# Patient Record
Sex: FEMALE
[De-identification: names, ages, dates, MRNs, and addresses within clinical notes are randomized per-mention and may not be internally consistent; named-entity substitution may affect disease eponyms.]

---

## 2018-01-31 ENCOUNTER — HOSPITAL ENCOUNTER (EMERGENCY)
Dept: HOSPITAL 14 - H.ER | Age: 83
LOS: 1 days | Discharge: NURSING, REHAB OR LONG-TERM CARE | End: 2018-02-01
Payer: SELF-PAY

## 2018-01-31 DIAGNOSIS — F43.20: Primary | ICD-10-CM

## 2018-01-31 DIAGNOSIS — N39.0: ICD-10-CM

## 2018-01-31 LAB
APAP SERPL-MCNC: < 10 UG/ML (ref 10–30)
BACTERIA #/AREA URNS HPF: (no result) /[HPF]
BASOPHILS # BLD AUTO: 0.1 K/UL (ref 0–0.2)
BASOPHILS NFR BLD: 1.2 % (ref 0–2)
BILIRUB UR-MCNC: NEGATIVE MG/DL
BUN SERPL-MCNC: 32 MG/DL (ref 7–17)
CALCIUM SERPL-MCNC: 10.4 MG/DL (ref 8.4–10.2)
COLOR UR: (no result)
EOSINOPHIL # BLD AUTO: 0.1 K/UL (ref 0–0.7)
EOSINOPHIL NFR BLD: 1.7 % (ref 0–4)
ERYTHROCYTE [DISTWIDTH] IN BLOOD BY AUTOMATED COUNT: 16.3 % (ref 11.5–14.5)
GFR NON-AFRICAN AMERICAN: 33
GLUCOSE UR STRIP-MCNC: (no result) MG/DL
HGB BLD-MCNC: 13.1 G/DL (ref 12–16)
LEUKOCYTE ESTERASE UR-ACNC: (no result) LEU/UL
LYMPHOCYTES # BLD AUTO: 1.5 K/UL (ref 1–4.3)
LYMPHOCYTES NFR BLD AUTO: 19 % (ref 20–40)
MCH RBC QN AUTO: 32.9 PG (ref 27–31)
MCHC RBC AUTO-ENTMCNC: 33 G/DL (ref 33–37)
MCV RBC AUTO: 99.8 FL (ref 81–99)
MONOCYTES # BLD: 0.8 K/UL (ref 0–0.8)
MONOCYTES NFR BLD: 9.7 % (ref 0–10)
NEUTROPHILS # BLD: 5.5 K/UL (ref 1.8–7)
NEUTROPHILS NFR BLD AUTO: 68.4 % (ref 50–75)
NRBC BLD AUTO-RTO: 0.1 % (ref 0–0)
PH UR STRIP: 7 [PH] (ref 5–8)
PLATELET # BLD: 242 K/UL (ref 130–400)
PMV BLD AUTO: 8.4 FL (ref 7.2–11.7)
PROT UR STRIP-MCNC: 100 MG/DL
RBC # BLD AUTO: 3.99 MIL/UL (ref 3.8–5.2)
RBC # UR STRIP: (no result) /UL
SALICYLATES SERPL-MCNC: < 1 MG/DL
SP GR UR STRIP: 1.01 (ref 1–1.03)
SQUAMOUS EPITHIAL: 3 /HPF (ref 0–5)
URINE CLARITY: (no result)
URINE NITRATE: NEGATIVE
UROBILINOGEN UR-MCNC: (no result) MG/DL (ref 0.2–1)
WBC # BLD AUTO: 8 K/UL (ref 4.8–10.8)
WBC CLUMPS # UR AUTO: (no result) /HPF

## 2018-01-31 PROCEDURE — 99283 EMERGENCY DEPT VISIT LOW MDM: CPT

## 2018-01-31 PROCEDURE — 81003 URINALYSIS AUTO W/O SCOPE: CPT

## 2018-01-31 PROCEDURE — 80048 BASIC METABOLIC PNL TOTAL CA: CPT

## 2018-01-31 PROCEDURE — 85025 COMPLETE CBC W/AUTO DIFF WBC: CPT

## 2018-01-31 NOTE — ED PDOC
HPI: Psych/Substance Abuse


Time Seen by Provider: 01/31/18 22:44


Chief Complaint (Nursing): Psychiatric Evaluation


Chief Complaint (Provider): Psychiatric Evaluation


History Per: Patient, EMS


History/Exam Limitations: no limitations


Onset/Duration Of Symptoms: Mins (prior to arrival )


Current Symptoms Are (Timing): Still Present


Additional Complaint(s): 


82 year old female was brought in by EMS for psychiatric evaluation, onset 

minutes prior to arrival. Patient was sent from nursing home after she was 

found smoking in her room. She then tried to hide herself in the bathroom and 

had to be forcibly extricated by EMS. Patient refuses to talk about the 

incident and currently has no complaints.








PMD: Dr. Kenny Olmos MD 











Past Medical History


Reviewed: Historical Data, Nursing Documentation, Vital Signs


Vital Signs: 





 Last Vital Signs











Temp  98.0 F   01/31/18 22:40


 


Pulse  96 H  01/31/18 22:40


 


Resp  16   01/31/18 22:40


 


BP  164/94 H  01/31/18 22:40


 


Pulse Ox  100   01/31/18 22:40














- Medical History


PMH: No Chronic Diseases





- Surgical History


Surgical History: No Surg Hx





- Family History


Family History: States: Unknown Family Hx





- Home Medications


Home Medications: 


 Ambulatory Orders











 Medication  Instructions  Recorded


 


Ciprofloxacin [Cipro] 500 mg PO BID 5 Days  tab 02/01/18














- Allergies


Allergies/Adverse Reactions: 


 Allergies











Allergy/AdvReac Type Severity Reaction Status Date / Time


 


cortisone Allergy  RASH Verified 01/31/18 22:43


 


mustard Allergy  RASH Uncoded 01/31/18 22:43














Review of Systems


ROS Statement: Except As Marked, All Systems Reviewed And Found Negative





Physical Exam





- Reviewed


Nursing Documentation Reviewed: Yes


Vital Signs Reviewed: Yes





- Physical Exam


Appears: Positive for: Non-toxic, No Acute Distress


Head Exam: Positive for: ATRAUMATIC, NORMOCEPHALIC


Skin: Positive for: Normal Color, Warm, Dry


Eye Exam: Positive for: EOMI, Normal appearance, PERRL


Neck: Positive for: Normal, Painless ROM, Supple


Cardiovascular/Chest: Positive for: Regular Rate, Rhythm.  Negative for: Murmur


Respiratory: Positive for: Normal Breath Sounds.  Negative for: Respiratory 

Distress


Gastrointestinal/Abdominal: Positive for: Normal Exam, Soft


Back: Positive for: Normal Inspection


Extremity: Positive for: Normal ROM.  Negative for: Deformity


Neurologic/Psych: Positive for: Alert, CNs II-XII (intact), Oriented (x 3).  

Negative for: Motor/Sensory Deficits, Cerebellar Tests





- Laboratory Results


Result Diagrams: 


 01/31/18 23:23





 01/31/18 23:23





- ECG


O2 Sat by Pulse Oximetry: 100 (RA)


Pulse Ox Interpretation: Normal





Medical Decision Making


Medical Decision Making: 


Time: 22:53 


Impression: adjustment disorder 


Initial Plan: 


--Acetaminophen 


--Alcohol serum 


--BMP 


--Urine drug


--Salicylate 


--CBC with differentials 


--urinalysis 


--Cipro 500 mg PO 








Patient was cleared by crisis by Dr. Fox. Diagnosed with adjustment 

disorder. 





Patient requires no further treatment in the ED at this time. Will be 

discharged home. Return if symptoms persist or worsen. 


--------------------------------------------------------------------------------

-----------------


Scribe Attestation:


Documented by Juliana Saenz, acting as a scribe for Kaleb Dawn MD.





Provider Scribe Attestation:


All medical record entries made by the Scribe were at my direction and 

personally dictated by me. I have reviewed the chart and agree that the record 

accurately reflects my personal performance of the history, physical exam, 

medical decision making, and the department course for this patient. I have 

also personally directed, reviewed, and agree with the discharge instructions 

and disposition.








Disposition





- Clinical Impression


Clinical Impression: 


 UTI (urinary tract infection), Adjustment disorder








- Patient ED Disposition


Is Patient to be Admitted: No





- Disposition


Referrals: 


[facility] [Outside]


Disposition: Routine/Home


Disposition Time: 00:46


Condition: IMPROVED


Prescriptions: 


Ciprofloxacin [Cipro] 500 mg PO BID 5 Days  tab


Instructions:  Urinary Tract Infection in Women (GEN), Mood Disorders (ED)


Forms:  [facility] (English)

## 2018-02-01 ENCOUNTER — HOSPITAL ENCOUNTER (INPATIENT)
Dept: HOSPITAL 14 - H.ER | Age: 83
LOS: 8 days | Discharge: NURSING, REHAB OR LONG-TERM CARE | DRG: 872 | End: 2018-02-09
Attending: INTERNAL MEDICINE | Admitting: INTERNAL MEDICINE
Payer: MEDICARE

## 2018-02-01 VITALS — OXYGEN SATURATION: 100 %

## 2018-02-01 VITALS
RESPIRATION RATE: 17 BRPM | HEART RATE: 92 BPM | TEMPERATURE: 98.2 F | DIASTOLIC BLOOD PRESSURE: 90 MMHG | SYSTOLIC BLOOD PRESSURE: 160 MMHG

## 2018-02-01 DIAGNOSIS — N39.0: ICD-10-CM

## 2018-02-01 DIAGNOSIS — B96.20: ICD-10-CM

## 2018-02-01 DIAGNOSIS — A41.9: Primary | ICD-10-CM

## 2018-02-01 DIAGNOSIS — E78.00: ICD-10-CM

## 2018-02-01 DIAGNOSIS — N17.9: ICD-10-CM

## 2018-02-01 DIAGNOSIS — F17.210: ICD-10-CM

## 2018-02-01 DIAGNOSIS — F39: ICD-10-CM

## 2018-02-01 DIAGNOSIS — D64.9: ICD-10-CM

## 2018-02-01 DIAGNOSIS — I10: ICD-10-CM

## 2018-02-01 DIAGNOSIS — Z16.12: ICD-10-CM

## 2018-02-01 DIAGNOSIS — F03.91: ICD-10-CM

## 2018-02-01 DIAGNOSIS — Z91.14: ICD-10-CM

## 2018-02-01 DIAGNOSIS — E86.0: ICD-10-CM

## 2018-02-01 DIAGNOSIS — F43.20: ICD-10-CM

## 2018-02-01 LAB
ALBUMIN SERPL-MCNC: 4.3 G/DL (ref 3.5–5)
ALBUMIN/GLOB SERPL: 1.2 {RATIO} (ref 1–2.1)
ALT SERPL-CCNC: 23 U/L (ref 9–52)
ANISOCYTOSIS BLD QL SMEAR: SLIGHT
AST SERPL-CCNC: 18 U/L (ref 14–36)
BACTERIA #/AREA URNS HPF: (no result) /[HPF]
BASOPHILS # BLD AUTO: 0.1 K/UL (ref 0–0.2)
BASOPHILS NFR BLD: 0.8 % (ref 0–2)
BASOPHILS NFR BLD: 1 % (ref 0–2)
BILIRUB UR-MCNC: NEGATIVE MG/DL
BUN SERPL-MCNC: 40 MG/DL (ref 7–17)
CALCIUM SERPL-MCNC: 10.7 MG/DL (ref 8.4–10.2)
COLOR UR: YELLOW
EOSINOPHIL # BLD AUTO: 0 K/UL (ref 0–0.7)
EOSINOPHIL NFR BLD: 0.2 % (ref 0–4)
ERYTHROCYTE [DISTWIDTH] IN BLOOD BY AUTOMATED COUNT: 16.3 % (ref 11.5–14.5)
GFR NON-AFRICAN AMERICAN: 25
GLUCOSE UR STRIP-MCNC: (no result) MG/DL
HGB BLD-MCNC: 12.8 G/DL (ref 12–16)
LEUKOCYTE ESTERASE UR-ACNC: (no result) LEU/UL
LG PLATELETS BLD QL SMEAR: PRESENT
LYMPHOCYTE: 10 % (ref 20–50)
LYMPHOCYTES # BLD AUTO: 0.7 K/UL (ref 1–4.3)
LYMPHOCYTES NFR BLD AUTO: 8.5 % (ref 20–40)
MACROCYTES BLD QL SMEAR: SLIGHT
MAGNESIUM SERPL-MCNC: 2 MG/DL (ref 1.6–2.3)
MCH RBC QN AUTO: 32.7 PG (ref 27–31)
MCHC RBC AUTO-ENTMCNC: 32.5 G/DL (ref 33–37)
MCV RBC AUTO: 100.5 FL (ref 81–99)
MONOCYTE: 8 % (ref 0–10)
MONOCYTES # BLD: 0.6 K/UL (ref 0–0.8)
MONOCYTES NFR BLD: 6.5 % (ref 0–10)
NEUTROPHILS # BLD: 7.4 K/UL (ref 1.8–7)
NEUTROPHILS NFR BLD AUTO: 75 % (ref 42–75)
NEUTROPHILS NFR BLD AUTO: 84 % (ref 50–75)
NEUTS BAND NFR BLD: 6 % (ref 0–2)
NRBC BLD AUTO-RTO: 0 % (ref 0–0)
PH UR STRIP: 6 [PH] (ref 5–8)
PLATELET # BLD EST: NORMAL 10*3/UL
PLATELET # BLD: 261 K/UL (ref 130–400)
PMV BLD AUTO: 7.9 FL (ref 7.2–11.7)
PROT UR STRIP-MCNC: 100 MG/DL
RBC # BLD AUTO: 3.91 MIL/UL (ref 3.8–5.2)
RBC # UR STRIP: NEGATIVE /UL
SP GR UR STRIP: 1.02 (ref 1–1.03)
SQUAMOUS EPITHIAL: 1 /HPF (ref 0–5)
TOTAL CELLS COUNTED BLD: 100
URINE CLARITY: (no result)
URINE HYALINE CAST: (no result) /HPF (ref 0–2)
URINE NITRATE: NEGATIVE
UROBILINOGEN UR-MCNC: (no result) MG/DL (ref 0.2–1)
WBC # BLD AUTO: 8.8 K/UL (ref 4.8–10.8)

## 2018-02-01 NOTE — ED PDOC
HPI: General Adult


Time Seen by Provider: 02/01/18 15:10


Chief Complaint (Nursing): Altered Mental Status


Chief Complaint (Provider): Altered Mental Status


History Per: Patient, EMS


History/Exam Limitations: no limitations


Onset/Duration Of Symptoms: Days (x today)


Current Symptoms Are (Timing): Still Present


Recently: Seen In ED


Additional History Per: Prior Records


Additional Complaint(s): 





Ms. Amraal is an 82 year old female, with a past medical history of dementia and 

mood disorder, who presents to the emergency department via EMS for aggressive 

behavior non-complaint with medication. Patient lives in nursing home. Nursing 

home states patient is refusing to take medications including antibiotics for 

UTI. Patient denies any anxiety or depression, urinary symptoms or abdominal 

pain. Patient is not sure why she was brought here. Patient was brought in last 

night. Patient was found smoking in her bathroom and locked herself inside. 

Patient was cleared and discharged.








PMD: Nickolas





Past Medical History


Reviewed: Historical Data, Nursing Documentation, Vital Signs


Vital Signs: 


 Last Vital Signs











Temp  97.9 F   02/06/18 15:59


 


Pulse  64   02/06/18 15:59


 


Resp  18   02/06/18 15:59


 


BP  114/67   02/06/18 15:59


 


Pulse Ox  95   02/06/18 15:59














- Medical History


PMH: Dementia, HTN, Hypercholesterolemia


   Denies: Diabetes, Hepatitis, HIV, Seizures, Sexually Transmitted Disease


Other PMH: Mood disorder





- Surgical History


Surgical History: No Surg Hx





- Family History


Family History: States: Unknown Family Hx





- Living Arrangements


Living Arrangements: Nursing Home/[facility]





- Social History


Current smoker - smoking cessation education provided: Yes (Heavy smoker)


Alcohol: None


Drugs: Denies





- Home Medications


Home Medications: 


 Ambulatory Orders











 Medication  Instructions  Recorded


 


Acetaminophen [Tylenol 325mg tab] 650 mg PO Q6H PRN 02/01/18


 


Acetaminophen [Tylenol 325mg tab] 650 mg PO Q6H PRN 02/01/18


 


Cholecalciferol [Vitamin D 1000 IU] 2,000 unit PO DAILY 02/01/18


 


Ciprofloxacin [Cipro] 500 mg PO BID 5 Days  tab 02/01/18


 


Cyproheptadine [Periactin] 4 mg PO BID 02/01/18


 


Divalproex [Depakote Sprinkles] 125 mg PO DAILY 02/01/18


 


Docusate [Colace] 100 mg PO BID 02/01/18


 


Escitalopram [Lexapro] 5 mg PO DAILY 02/01/18


 


Famotidine [Pepcid] 20 mg PO HS 02/01/18


 


Ferrous Sulfate [Ferrous Sulfate] 325 mg PO QPM 02/01/18


 


Fluticasone/Salmeterol 250/50 1 puff IH Q12H 02/01/18





[Advair Diskus 250/50]  


 


Folic Acid [Folic Acid] 1 mg PO DAILY 02/01/18


 


Magnesium Hydroxide [Milk Of 30 ml PO DAILY PRN 02/01/18





Magnesia]  


 


Simvastatin [Simvastatin] 10 mg PO HS 02/01/18


 


amLODIPine [Norvasc] 10 mg PO DAILY 02/01/18














- Allergies


Allergies/Adverse Reactions: 


 Allergies











Allergy/AdvReac Type Severity Reaction Status Date / Time


 


cortisone Allergy  RASH Verified 01/31/18 22:43


 


mustard Allergy  RASH Uncoded 01/31/18 22:43














Review of Systems


ROS Statement: Except As Marked, All Systems Reviewed And Found Negative


Gastrointestinal: Negative for: Abdominal Pain


Genitourinary Female: Negative for: Dysuria, Hematuria


Psych: Negative for: Anxiety, Depression





Physical Exam





- Reviewed


Nursing Documentation Reviewed: Yes


Vital Signs Reviewed: Yes





- Physical Exam


Appears: Positive for: Non-toxic, In Acute Distress (psychiatric distress)


Head Exam: Positive for: ATRAUMATIC


Skin: Positive for: Warm, Dry


Eye Exam: Positive for: EOMI, PERRL


ENT: Positive for: Other (dry mucus membranes, brown stained dentition)


Neck: Positive for: Painless ROM, Supple


Cardiovascular/Chest: Positive for: Tachycardia.  Negative for: Murmur


Respiratory: Positive for: Normal Breath Sounds.  Negative for: Respiratory 

Distress


Gastrointestinal/Abdominal: Positive for: Soft.  Negative for: Tenderness


Back: Positive for: Normal Inspection.  Negative for: Decreased ROM


Extremity: Positive for: Normal ROM.  Negative for: Deformity


Lymphatic: Negative for: Adenopathy


Neurologic/Psych: Positive for: Alert, Oriented (x2), Mood/Affect (normal mood, 

anxious and easily agitated affect), Other (pressured speech and tangential 

thought and forgetfullness).  Negative for: Motor/Sensory Deficits, Aphasia, 

Facial Droop





- Laboratory Results


Result Diagrams: 


 02/05/18 06:15





 02/05/18 06:15





- ECG


ECG: Positive for: Interpreted By Me


ECG Rhythm: Positive for: Sinus Rhythm, Nonspecific Changes


O2 Sat by Pulse Oximetry: 98 (RA)


Pulse Ox Interpretation: Normal





Medical Decision Making


Medical Decision Making: 





Time: 15:17


Impression(s): UTI, Dementia, Dehydration


Differentials include, not limited to: Sepsis, Electrolyte abnormalities, 

Decompensating Mood Disorder


Plan:


- EKG


- CMP


- Lactic Acid, Plasma Stat


- Magnesium Stat


- Phosphorous Stat


- Thyroid Stimulating Hormone Stat


- Crisis Evaluation


- CBC


- Blood Culture


- Urine Culture


- Urinalysis


 


Accession No. : [id number]


Patient Name / ID : JORGE ELISE  / [id number]


Exam Date : 02/01/2018 16:30:33 ( Approved )


Study Comment : 


Sex / Age : F  / 82Y





Creator : Niko Chowdhury MD


Dictator : Niko Chowdhury MD


 : 


Approver : Niko Chowdhury MD


Approver2 : 





Report Date : 02/01/2018 17:17:56


My Comment : 


********************************************************************************

***





HISTORY:


Admission.





COMPARISON:


No prior. 





FINDINGS:





LUNGS:


No active pulmonary disease.





PLEURA:


No significant pleural effusion identified, no pneumothorax apparent.





CARDIOVASCULAR:


 No radiographic findings to suggest acute or significant cardiovascular 

disease.





OSSEOUS STRUCTURES:


No significant abnormalities.





VISUALIZED UPPER ABDOMEN:


Normal.





OTHER FINDINGS:


None.





IMPRESSION:


No active disease.


 Evaluted by CARMEN Day who dw Dr Escamilla. Pt to be hospitalized to [facility], 

pending discussion with patient's POA.





When CW d/w POA, POA refused to come in to sign paperwork. Pt placed on medical 

service for UTI, altered mental status, and dehydration, with inpatient 

psychiatric consult.





--------------------------------------------------------------------------------

-----------------


Scribe Attestation:


Documented by Taco Anderson, acting as a scribe for Rae Gramajo MD.





Provider Scribe Attestation:


All medical record entries made by the Scribe were at my direction and 

personally dictated by me. I have reviewed the chart and agree that the record 

accurately reflects my personal performance of the history, physical exam, 

medical decision making, and the department course for this patient. I have 

also personally directed, reviewed, and agree with the discharge instructions 

and disposition.





Disposition





- Clinical Impression


Clinical Impression: 


 Dehydration, UTI (urinary tract infection), Altered mental status, BHUMI (acute 

kidney injury)





Discussed With DrKadi: Nasir Boone


Doctor Will See Patient In The: Hospital





- Disposition


Disposition Time: 22:00


Condition: FAIR





- Pt Status Changed To:


Hospital Disposition Of: Observation





- POA


Present On Arrival: None

## 2018-02-01 NOTE — RAD
HISTORY:

Admission.



COMPARISON:

No prior. 



FINDINGS:



LUNGS:

No active pulmonary disease.



PLEURA:

No significant pleural effusion identified, no pneumothorax apparent.



CARDIOVASCULAR:

 No radiographic findings to suggest acute or significant 

cardiovascular disease.



OSSEOUS STRUCTURES:

No significant abnormalities.



VISUALIZED UPPER ABDOMEN:

Normal.



OTHER FINDINGS:

None.



IMPRESSION:

No active disease.

## 2018-02-02 LAB
ALBUMIN SERPL-MCNC: 3.8 G/DL (ref 3.5–5)
ALBUMIN/GLOB SERPL: 1.1 {RATIO} (ref 1–2.1)
ALT SERPL-CCNC: 21 U/L (ref 9–52)
AST SERPL-CCNC: 21 U/L (ref 14–36)
BUN SERPL-MCNC: 38 MG/DL (ref 7–17)
CALCIUM SERPL-MCNC: 10.1 MG/DL (ref 8.4–10.2)
ERYTHROCYTE [DISTWIDTH] IN BLOOD BY AUTOMATED COUNT: 16.4 % (ref 11.5–14.5)
GFR NON-AFRICAN AMERICAN: 36
HDLC SERPL-MCNC: 48 MG/DL (ref 30–70)
HGB BLD-MCNC: 12.3 G/DL (ref 12–16)
LDLC SERPL-MCNC: 85 MG/DL (ref 0–129)
MCH RBC QN AUTO: 33.3 PG (ref 27–31)
MCHC RBC AUTO-ENTMCNC: 33.2 G/DL (ref 33–37)
MCV RBC AUTO: 100.1 FL (ref 81–99)
PLATELET # BLD: 224 K/UL (ref 130–400)
RBC # BLD AUTO: 3.7 MIL/UL (ref 3.8–5.2)
VIT B12 SERPL-MCNC: 432 PG/ML (ref 239–931)
WBC # BLD AUTO: 6.4 K/UL (ref 4.8–10.8)

## 2018-02-02 RX ADMIN — CIPROFLOXACIN SCH MLS/HR: 2 INJECTION, SOLUTION INTRAVENOUS at 21:20

## 2018-02-02 RX ADMIN — CIPROFLOXACIN SCH MLS/HR: 2 INJECTION, SOLUTION INTRAVENOUS at 11:59

## 2018-02-02 RX ADMIN — DEXTROSE AND SODIUM CHLORIDE SCH MLS/HR: 5; 450 INJECTION, SOLUTION INTRAVENOUS at 14:40

## 2018-02-02 RX ADMIN — VITAMIN D, TAB 1000IU (100/BT) SCH INTLU: 25 TAB at 09:06

## 2018-02-02 RX ADMIN — FLUTICASONE PROPIONATE AND SALMETEROL SCH PUFF: 50; 250 POWDER RESPIRATORY (INHALATION) at 19:06

## 2018-02-02 RX ADMIN — DIVALPROEX SODIUM SCH MG: 125 CAPSULE ORAL at 09:06

## 2018-02-02 NOTE — CP.PCM.CON
History of Present Illness





- History of Present Illness


History of Present Illness: 


Psychiatry consult note





CC: "I'm okay."





HPI: 81 yo female, resident of [facility], was sent from her 

nursing home w/ acute agitation in the context of a UTI.  Patient is currently 

calm with writer, denies depression/anxiety/AH/VH/SI/HI.  She does not have any 

acute psychiatric complaints and does not think she needs psychiatric 

admission.  A + O x 3.  Denies ideation to harm others.





PMHx: UTI, HTN, Anemia, Asthma, Renal Insufficiency





PPHx: H/o Dementia and mood disorder





ALL: Cortisone, Mustard





SHx: Resident of [facility].  Denies drugs/etoh; smokes 1PPD





MSE: A + O x 3, calm, cooperative, good eye contact, mood "okay", affect- 

neutral, speech normal, NO AH/VH/SI/HI, no delusions, thought process- coherent/

linear; limited I/J due to chronic dementia





Impression: 81 yo female w/ Dementia and mood disorder, now calm and 

cooperative w/o acute psychiatric complaints.  Patient's behavioral disturbance 

is likely due to acute UTI.





-Continue Lexapro 5 mg PO Daily


-Continue Depakote 125 mg PO Daily; if patient continues to have behavioral 

disturbances, can increase Depakote to 125 mg PO BID and check VPA level


-No acute inpatient psychiatric admission indicated at this time


-Avoid benzodiazepines as they can worsen confusion in the elderly and increase 

risks of falls


-If patient becomes acutely agitated and needs IM medication, can given Haldol 

0.5 mg IM q6hr PRN agitation





Past Patient History





- Past Medical History & Family History


Past Medical History?: Yes





- Past Social History


Smoking Status: Current Some Days Smoker





- CARDIAC


Hx Cardiac Disorders: Yes (HTN, high cholesterol)





- PULMONARY


Hx Respiratory Disorders: Yes (COPD)





- NEUROLOGICAL


Hx Neurological Disorder: No





- HEENT


Hx HEENT Problems: No





- RENAL


Hx Chronic Kidney Disease: No





- ENDOCRINE/METABOLIC


Hx Endocrine Disorders: No





- HEMATOLOGICAL/ONCOLOGICAL


Hx Blood Disorders: No





- INTEGUMENTARY


Hx Dermatological Problems: No





- MUSCULOSKELETAL/RHEUMATOLOGICAL


Hx Musculoskeletal Disorders: No


Hx Falls: No





- GENITOURINARY/GYNECOLOGICAL


Hx Genitourinary Disorders: No





- PSYCHIATRIC


Hx Psychophysiologic Disorder: Yes (dementia, mood disorder)





- SURGICAL HISTORY


Other/Comment: pt denies edwin surgeries





- ANESTHESIA


Hx Anesthesia: No





Meds


Allergies/Adverse Reactions: 


 Allergies











Allergy/AdvReac Type Severity Reaction Status Date / Time


 


cortisone Allergy  RASH Verified 01/31/18 22:43


 


mustard Allergy  RASH Uncoded 01/31/18 22:43














- Medications


Medications: 


 Current Medications





Acetaminophen (Tylenol 325mg Tab)  650 mg PO Q6H PRN


   PRN Reason: Temp >101


Acetaminophen (Tylenol 325mg Tab)  650 mg PO Q6H PRN


   PRN Reason: Pain, Mild (1-3)


Amlodipine Besylate (Norvasc)  10 mg PO DAILY [facility]


   Last Admin: 02/02/18 09:12 Dose:  10 mg


Cholecalciferol (Vitamin D)  2,000 intlu PO DAILY [facility]


   Last Admin: 02/02/18 09:06 Dose:  2,000 intlu


Cyproheptadine HCl (Periactin)  4 mg PO BID [facility]


   Last Admin: 02/02/18 09:06 Dose:  4 mg


Divalproex Sodium (Depakote Sprinkles)  125 mg PO DAILY [facility]


   Last Admin: 02/02/18 09:06 Dose:  125 mg


Docusate Sodium (Colace)  100 mg PO BID [facility]


   Last Admin: 02/02/18 09:06 Dose:  100 mg


Escitalopram Oxalate (Lexapro)  5 mg PO DAILY [facility]


   Last Admin: 02/02/18 09:06 Dose:  5 mg


Famotidine (Pepcid)  20 mg PO HS [facility]


Ferrous Sulfate (Feosol)  325 mg PO QPM [facility]


Folic Acid (Folic Acid)  1 mg PO DAILY [facility]


   Last Admin: 02/02/18 09:11 Dose:  1 mg


Heparin Sodium (Porcine) (Heparin)  5,000 units SC Q12 [facility]


   PRN Reason: Protocol


Ciprofloxacin (Cipro 400mg/200ml Dsw)  400 mg in 200 mls @ 200 mls/hr IVPB Q12 

[facility]


   PRN Reason: Protocol


Magnesium Hydroxide (Milk Of Magnesia)  30 ml PO DAILY PRN


   PRN Reason: Constipation


Pravastatin Sodium (Pravachol)  20 mg PO HS [facility]


Fluticasone/Salmeterol (Advair Diskus 250/50)  1 puff IH Q12H [facility]











Results





- Vital Signs


Recent Vital Signs: 


 Last Vital Signs











Temp  97.3 F L  02/02/18 03:31


 


Pulse  77   02/02/18 09:12


 


Resp  18   02/02/18 03:31


 


BP  130/66   02/02/18 09:12


 


Pulse Ox  94 L  02/02/18 03:31














- Labs


Result Diagrams: 


 02/02/18 08:55





 02/02/18 08:55


Labs: 


 Laboratory Results - last 24 hr











  02/01/18 02/01/18 02/01/18





  15:45 15:45 15:45


 


WBC  8.8  


 


RBC  3.91  


 


Hgb  12.8  


 


Hct  39.3  


 


MCV  100.5 H  


 


MCH  32.7 H  


 


MCHC  32.5 L  


 


RDW  16.3 H  


 


Plt Count  261  


 


MPV  7.9  


 


Neut % (Auto)  84.0 H  


 


Lymph % (Auto)  8.5 L  


 


Mono % (Auto)  6.5  


 


Eos % (Auto)  0.2  


 


Baso % (Auto)  0.8  


 


Neut # (Auto)  7.4 H  


 


Lymph # (Auto)  0.7 L  


 


Mono # (Auto)  0.6  


 


Eos # (Auto)  0.0  


 


Baso # (Auto)  0.1  


 


Neutrophils % (Manual)  75  


 


Band Neutrophils %  6 H  


 


Lymphocytes % (Manual)  10 L  


 


Monocytes % (Manual)  8  


 


Basophils % (Manual)  1  


 


Platelet Estimate  Normal  


 


Large Platelets  Present  


 


Anisocytosis (manual)  Slight  


 


Macrocytosis (manual)  Slight  


 


Sodium   145 


 


Potassium   4.0 


 


Chloride   106 


 


Carbon Dioxide   24 


 


Anion Gap   19 


 


BUN   40 H 


 


Creatinine   1.9 H 


 


Est GFR ( Amer)   31 


 


Est GFR (Non-Af Amer)   25 


 


Random Glucose   157 H 


 


Lactic Acid    1.6


 


Calcium   10.7 H 


 


Phosphorus   3.5 


 


Magnesium   2.0 


 


Total Bilirubin   0.9 


 


AST   18 


 


ALT   23 


 


Alkaline Phosphatase   59 


 


Total Protein   7.9 


 


Albumin   4.3 


 


Globulin   3.5 


 


Albumin/Globulin Ratio   1.2 


 


Triglycerides   


 


Cholesterol   


 


LDL Cholesterol Direct   


 


HDL Cholesterol   


 


Vitamin B12   


 


TSH 3rd Generation   3.40 


 


Urine Color   


 


Urine Clarity   


 


Urine pH   


 


Ur Specific Gravity   


 


Urine Protein   


 


Urine Glucose (UA)   


 


Urine Ketones   


 


Urine Blood   


 


Urine Nitrate   


 


Urine Bilirubin   


 


Urine Urobilinogen   


 


Ur Leukocyte Esterase   


 


Urine RBC (Auto)   


 


Urine Microscopic WBC   


 


Ur Squamous Epith Cells   


 


Urine Bacteria   


 


Hyaline Casts   


 


Valproic Acid   














  02/01/18 02/01/18 02/02/18





  15:45 18:00 08:55


 


WBC    6.4


 


RBC    3.70 L


 


Hgb    12.3


 


Hct    37.0


 


MCV    100.1 H


 


MCH    33.3 H


 


MCHC    33.2


 


RDW    16.4 H


 


Plt Count    224


 


MPV   


 


Neut % (Auto)   


 


Lymph % (Auto)   


 


Mono % (Auto)   


 


Eos % (Auto)   


 


Baso % (Auto)   


 


Neut # (Auto)   


 


Lymph # (Auto)   


 


Mono # (Auto)   


 


Eos # (Auto)   


 


Baso # (Auto)   


 


Neutrophils % (Manual)   


 


Band Neutrophils %   


 


Lymphocytes % (Manual)   


 


Monocytes % (Manual)   


 


Basophils % (Manual)   


 


Platelet Estimate   


 


Large Platelets   


 


Anisocytosis (manual)   


 


Macrocytosis (manual)   


 


Sodium   


 


Potassium   


 


Chloride   


 


Carbon Dioxide   


 


Anion Gap   


 


BUN   


 


Creatinine   


 


Est GFR ( Amer)   


 


Est GFR (Non-Af Amer)   


 


Random Glucose   


 


Lactic Acid   


 


Calcium   


 


Phosphorus   


 


Magnesium   


 


Total Bilirubin   


 


AST   


 


ALT   


 


Alkaline Phosphatase   


 


Total Protein   


 


Albumin   


 


Globulin   


 


Albumin/Globulin Ratio   


 


Triglycerides   


 


Cholesterol   


 


LDL Cholesterol Direct   


 


HDL Cholesterol   


 


Vitamin B12   


 


TSH 3rd Generation   


 


Urine Color   Yellow 


 


Urine Clarity   Cloudy 


 


Urine pH   6.0 


 


Ur Specific Gravity   1.017 


 


Urine Protein   100 


 


Urine Glucose (UA)   Neg 


 


Urine Ketones   Trace 


 


Urine Blood   Negative 


 


Urine Nitrate   Negative 


 


Urine Bilirubin   Negative 


 


Urine Urobilinogen   0.2-1.0 


 


Ur Leukocyte Esterase   Mod 


 


Urine RBC (Auto)   1 


 


Urine Microscopic WBC   16 H 


 


Ur Squamous Epith Cells   1 


 


Urine Bacteria   Rare 


 


Hyaline Casts   0-2 


 


Valproic Acid  < 10.0 L  














  02/02/18





  08:55


 


WBC 


 


RBC 


 


Hgb 


 


Hct 


 


MCV 


 


MCH 


 


MCHC 


 


RDW 


 


Plt Count 


 


MPV 


 


Neut % (Auto) 


 


Lymph % (Auto) 


 


Mono % (Auto) 


 


Eos % (Auto) 


 


Baso % (Auto) 


 


Neut # (Auto) 


 


Lymph # (Auto) 


 


Mono # (Auto) 


 


Eos # (Auto) 


 


Baso # (Auto) 


 


Neutrophils % (Manual) 


 


Band Neutrophils % 


 


Lymphocytes % (Manual) 


 


Monocytes % (Manual) 


 


Basophils % (Manual) 


 


Platelet Estimate 


 


Large Platelets 


 


Anisocytosis (manual) 


 


Macrocytosis (manual) 


 


Sodium  146


 


Potassium  4.1


 


Chloride  109 H


 


Carbon Dioxide  26


 


Anion Gap  15


 


BUN  38 H


 


Creatinine  1.4 H


 


Est GFR ( Amer)  44


 


Est GFR (Non-Af Amer)  36


 


Random Glucose  93


 


Lactic Acid 


 


Calcium  10.1


 


Phosphorus 


 


Magnesium 


 


Total Bilirubin  0.9


 


AST  21


 


ALT  21


 


Alkaline Phosphatase  50


 


Total Protein  7.2


 


Albumin  3.8


 


Globulin  3.4


 


Albumin/Globulin Ratio  1.1


 


Triglycerides  64


 


Cholesterol  157


 


LDL Cholesterol Direct  85


 


HDL Cholesterol  48


 


Vitamin B12  432


 


TSH 3rd Generation  1.43


 


Urine Color 


 


Urine Clarity 


 


Urine pH 


 


Ur Specific Gravity 


 


Urine Protein 


 


Urine Glucose (UA) 


 


Urine Ketones 


 


Urine Blood 


 


Urine Nitrate 


 


Urine Bilirubin 


 


Urine Urobilinogen 


 


Ur Leukocyte Esterase 


 


Urine RBC (Auto) 


 


Urine Microscopic WBC 


 


Ur Squamous Epith Cells 


 


Urine Bacteria 


 


Hyaline Casts 


 


Valproic Acid

## 2018-02-02 NOTE — CARD
--------------- APPROVED REPORT --------------





EKG Measurement

Heart [id number]

DC [id number]

[id number]

[id number]

[id number]



<Conclusion>

Normal sinus rhythm

Minimal voltage criteria for LVH, may be normal variant

Nonspecific ST and T wave abnormality

Prolonged QT

Abnormal ECG

## 2018-02-03 LAB
BUN SERPL-MCNC: 32 MG/DL (ref 7–17)
CALCIUM SERPL-MCNC: 9.7 MG/DL (ref 8.4–10.2)
ERYTHROCYTE [DISTWIDTH] IN BLOOD BY AUTOMATED COUNT: 16.3 % (ref 11.5–14.5)
GFR NON-AFRICAN AMERICAN: 39
HGB BLD-MCNC: 11.9 G/DL (ref 12–16)
MCH RBC QN AUTO: 33.1 PG (ref 27–31)
MCHC RBC AUTO-ENTMCNC: 32.8 G/DL (ref 33–37)
MCV RBC AUTO: 101 FL (ref 81–99)
PLATELET # BLD: 201 K/UL (ref 130–400)
RBC # BLD AUTO: 3.6 MIL/UL (ref 3.8–5.2)
WBC # BLD AUTO: 6.1 K/UL (ref 4.8–10.8)

## 2018-02-03 RX ADMIN — VITAMIN D, TAB 1000IU (100/BT) SCH INTLU: 25 TAB at 09:20

## 2018-02-03 RX ADMIN — DEXTROSE AND SODIUM CHLORIDE SCH: 5; 450 INJECTION, SOLUTION INTRAVENOUS at 11:19

## 2018-02-03 RX ADMIN — CIPROFLOXACIN SCH MLS/HR: 2 INJECTION, SOLUTION INTRAVENOUS at 09:17

## 2018-02-03 RX ADMIN — FLUTICASONE PROPIONATE AND SALMETEROL SCH PUFF: 50; 250 POWDER RESPIRATORY (INHALATION) at 17:14

## 2018-02-03 RX ADMIN — DIVALPROEX SODIUM SCH MG: 125 CAPSULE ORAL at 11:20

## 2018-02-03 RX ADMIN — FLUTICASONE PROPIONATE AND SALMETEROL SCH PUFF: 50; 250 POWDER RESPIRATORY (INHALATION) at 07:39

## 2018-02-03 RX ADMIN — DEXTROSE AND SODIUM CHLORIDE SCH MLS/HR: 5; 450 INJECTION, SOLUTION INTRAVENOUS at 13:32

## 2018-02-03 NOTE — ED
CHIEF COMPLAINT:  Altered mental status.



HISTORY OF PRESENT ILLNESS:  This is an 82-year-old female known case of

dementia, hypertension, and elevated cholesterol, who was very noncompliant

with her medications in nursing home and became very aggressive, so the

patient was sent to Emergency Room and was admitted for further management.

The patient is not able to provide informative history.



REVIEW OF SYSTEMS:  Available review of systems is negative for her

headache, dizziness, syncope, loss of consciousness, chest pain, shortness

of breath, nausea, vomiting, diarrhea, constipation or any urinary or GI

symptoms.  Review of systems is positive for aggressive behavior and

altered mental status.  Review of systems of all other organ system is

unremarkable and unreliable as the patient is not a good historian.



PAST MEDICAL HISTORY:  Significant for elevated cholesterol, hypertension,

and dementia.  The patient is a nursing home resident.



PAST SURGICAL HISTORY:  Unremarkable.



PERSONAL HISTORY:  The patient is currently living in nursing home,

nonsmoker, nondrinker, no substance abuse.



MEDICATIONS:  The patient is on multiple medications including Tylenol,

vitamin D, Cipro, Periactin, Depakote, Colace, Lexapro, Pepcid, iron,

Advair, folic acid, magnesium, milk of magnesia, simvastatin, amlodipine,

all the other as mentioned earlier.  The patient is very noncompliant with

medication.



ALLERGIES:  THE PATIENT IS NOT ALLERGIC TO ANY MEDICATIONS.



FAMILY HISTORY:  Noncontributory.



PHYSICAL EXAMINATION:

GENERAL:  A well-built, well-nourished, pale elderly female, in no acute

distress.

VITAL SIGNS:  Temperature 98.3, pulse 76, respirations 20, blood pressure

116/72, and saturations 100%.

HEENT:  Pupils reacting to light.  No JVD.  No thyromegaly.  No

lymphadenopathy.  No nystagmus.  Normocephalic and atraumatic skull.

HEART:  S1 and S2, normal and regular.  No significant murmur, gallop or

rub is heard.

LUNGS:  Exam shows good bilateral air exchange.  No rales or rhonchi.

ABDOMEN:  Soft and nontender.  No organomegaly.  No fluid.  Bowel sounds

are present and normal.

EXTREMITIES:  No edema.  No calf swelling.  No tenderness.  No acute

ischemia.

CENTRAL NERVOUS SYSTEM:  The patient is awake, responsive, and conversant

_____.  There is no sign of any acute gross focal motor or sensory

neurological deficits.



LABORATORY DATA:  Available diagnostic data is reviewed.  Urinalysis and

_____ urine pictures show gram-negative rods.  Blood cultures remains

negative.  WBC 6.4, hemoglobin 12.3, hematocrit 37, and platelets 224. 

Sodium 146, potassium 4.1, chloride 109, bicarb 26, BUN 38, and creatinine

1.4, earlier BUN was 40 and creatinine was 1.9.



ADMITTING IMPRESSION:  Altered mental status, dehydration, urinary tract

infection, hypertension, elevated cholesterol, and dementia.



PLAN:  As ordered.  Psychiatric consult noted and appreciated.





__________________________________________

Nasir Boone MD





DD:  02/03/2018 6:44:48

DT:  02/03/2018 7:37:56

Job # [id number]

## 2018-02-04 LAB
ALBUMIN SERPL-MCNC: 3.2 G/DL (ref 3.5–5)
ALBUMIN/GLOB SERPL: 1 {RATIO} (ref 1–2.1)
ALT SERPL-CCNC: 15 U/L (ref 9–52)
AST SERPL-CCNC: 22 U/L (ref 14–36)
BUN SERPL-MCNC: 26 MG/DL (ref 7–17)
CALCIUM SERPL-MCNC: 9.4 MG/DL (ref 8.4–10.2)
ERYTHROCYTE [DISTWIDTH] IN BLOOD BY AUTOMATED COUNT: 16.2 % (ref 11.5–14.5)
GFR NON-AFRICAN AMERICAN: 43
HGB BLD-MCNC: 11.7 G/DL (ref 12–16)
MCH RBC QN AUTO: 32.8 PG (ref 27–31)
MCHC RBC AUTO-ENTMCNC: 32.5 G/DL (ref 33–37)
MCV RBC AUTO: 100.9 FL (ref 81–99)
PLATELET # BLD: 192 K/UL (ref 130–400)
RBC # BLD AUTO: 3.57 MIL/UL (ref 3.8–5.2)
WBC # BLD AUTO: 5.9 K/UL (ref 4.8–10.8)

## 2018-02-04 RX ADMIN — DIVALPROEX SODIUM SCH MG: 125 CAPSULE ORAL at 09:40

## 2018-02-04 RX ADMIN — FLUTICASONE PROPIONATE AND SALMETEROL SCH PUFF: 50; 250 POWDER RESPIRATORY (INHALATION) at 05:11

## 2018-02-04 RX ADMIN — VITAMIN D, TAB 1000IU (100/BT) SCH INTLU: 25 TAB at 09:42

## 2018-02-04 RX ADMIN — DEXTROSE AND SODIUM CHLORIDE SCH MLS/HR: 5; 450 INJECTION, SOLUTION INTRAVENOUS at 09:47

## 2018-02-04 RX ADMIN — FLUTICASONE PROPIONATE AND SALMETEROL SCH PUFF: 50; 250 POWDER RESPIRATORY (INHALATION) at 17:00

## 2018-02-04 NOTE — PN
DATE:  02/04/2018



SUBJECTIVE:  The patient is seen and examined.  Interim events noted.  The

patient remains on one-to-one observation for safety.  We check this visit

followup and interventions noted and appreciated.  The patient had ESBL in

urine.  The patient feels okay.  Denies having specific complaints, cough,

_____, but confused.  No chest pain and no shortness of breath.  No urinary

symptoms.  _____.  No issue reported by nursing staff.



PHYSICAL EXAMINATION:

GENERAL:  The patient is in no acute distress.

VITAL SIGNS:  Stable.

HEART:  S1 and S2, normal and regular.

LUNGS:  Good bilateral air exchange.

ABDOMEN:  Soft and nontender.

EXTREMITIES:  No edema.  No calf swelling.  No tenderness.  No acute

ischemia.

CENTRAL NERVOUS SYSTEM:  Exam is essentially unchanged.



DIAGNOSTIC DATA:  Available diagnostic data reviewed.



ASSESSMENT AND PLAN:  The patient has extended-spectrum beta-lactamase

organism in urine culture.  Plan as ordered.







__________________________________________

Nasir Boone MD







DD:  02/04/2018 6:31:27

DT:  02/04/2018 9:56:53

Job # [id number]

## 2018-02-04 NOTE — CP.PCM.CON
History of Present Illness





- History of Present Illness


History of Present Illness: 





81 yo female, resident of [facility], was sent from her nursing 

home w/ acute agitation in the context of a UTI.  Patient is currently calm 

with writer, denies depression/anxiety/AH/VH/SI/HI.  she c/o back pain and was 

found to have ESBL + urine c/s











PMHx: UTI, HTN, Anemia, Asthma, Renal Insufficiency





PPHx: H/o Dementia and mood disorder





ALL: Cortisone, Mustard





SHx: Resident of [facility].  Denies drugs/etoh; smokes 1PPD





Review of Systems





- Review of Systems


All systems: reviewed and no additional remarkable complaints except





- Constitutional


Constitutional: As Per HPI





- EENT


Eyes: absent: As Per HPI, Blind Spots, Blurred Vision, Change in Vision, 

Decreased Night Vision, Diplopia, Discharge, Dry Eye, Exophthalmos, Floaters, 

Irritation, Itchy Eyes, Loss of Peripheral Vision, Pain, Photophobia, Requires 

Corrective Lenses, Sees Flashes, Spots in Vision, Tunnel Vision, Other Visual 

Disturbances, Loss of Vision, Other


Ears: absent: As Per HPI, Decreased Hearing, Ear Discharge, Ear Pain, Tinnitus, 

Abnormal Hearing, Disequilibrium, Dizziness, Other


Nose/Mouth/Throat: absent: As Per HPI, Epistaxis, Nasal Congestion, Nasal 

Discharge, Nasal Obstruction, Nasal Trauma, Nose Pain, Post Nasal Drip, Sinus 

Pain, Sinus Pressure, Bleeding Gums, Change in Voice, Dental Pain, Dry Mouth, 

Dysphagia, Halitosis, Hoarsness, Lip Swelling, Mouth Lesions, Mouth Pain, 

Odynophagia, Sore Throat, Throat Swelling, Tongue Swelling, Facial Pain, Neck 

Pain, Neck Mass, Other





- Breasts


Breasts: absent: As Per HPI, Change in Shape, Mass, Pain, Nipple Discharge, 

Nipple Inversion, Skin Changes, Swelling, Other





- Cardiovascular


Cardiovascular: absent: As Per HPI, Acrocyanosis, Chest Pain, Chest Pain at Rest

, Chest Pain with Activity, Claudication, Diaphoresis, Dyspnea, Dyspnea on 

Exertion, Edema, Irregular Heart Rhythm, Pain Radiating to Arm/Neck/Jaw, Leg 

Edema, Leg Ulcers, Lightheadedness, Orthopnea, Palpitations, Paroxysmal 

Nocturnal Dyspnea, Pedal Edema, Radiating Pain, Rapid Heart Rate, Slow Heart 

Rate, Syncope, Other





- Respiratory


Respiratory: absent: As Per HPI, Cough, Dyspnea, Hemoptysis, Dyspnea on Exertion

, Wheezing, Snoring, Stridor, Pain on Inspiration, Chest Congestion, Excessive 

Mucous Production, Change in Mucous Color, Pain with Coughing, Other





- Gastrointestinal


Gastrointestinal: absent: As Per HPI, Abdominal Pain, Belching, Bloating, 

Change in Bowel Habits, Change in Stool Character, Coffee Ground Emesis, 

Constipation, Cramping, Diarrhea, Dyspepsia, Dysphagia, Early Satiety, 

Excessive Flatus, Fecal Incontinence, Heartburn, Hematemesis, Hematochezia, 

Loose Stools, Melena, Nausea, Odynophagia, Temesmus, Vomiting, Other





- Genitourinary


Genitourinary: As Per HPI





- Reproductive: Female


Reproductive:Female: absent: As Per HPI, Amenorrhea, Amenorrhea/Birth Control, 

Currently Menstual, Cycle <21 Days, Cycle >35 Days, Cycle Variable, Menses 1-7 

Days, Menses >/= 8 Days, Menses Variable, Cycle > 4 Weeks Between, No Menses 

for 6 Months, Heavy Menses, Light Menses, Normal Menses, Spotting Between Cycles

, S/P Hysterectomy, Menopausal, Post Menopausal, Premenarche, Abnormal Vaginal 

Bleeding, Dysmenorrhea, Dyspareunia, Genital Lesions, Genital Pruritis, Pelvic 

Pain, Prolapse Symptoms, Sexual Dysfunction, Vaginal Discharge, Vaginal Dryness

, Vaginal Odor, Vaginal Pruritis, Other





- Menstruation


Menstruation: absent: As Per HPI, Amenorrhea, Amenorrhea/Birth Control, 

Currently Menstual, Cycle <21 Days, Cycle >35 Days, Cycle Variable, Menses 1-7 

Days, Menses >/= 8 Days, Menses Variable, Cycle > 4 Weeks Between, No Menses 

for 6 Months, Heavy Menses, Light Menses, Normal Menses, Spotting Between Cycles

, S/P Hysterectomy, Menopausal, Post Menopausal, Premenarche, Abnormal Vaginal 

Bleeding, Dysmenorrhea, Other





- Musculoskeletal


Musculoskeletal: As Per HPI





- Integumentary


Integumentary: absent: As Per HPI, Acne, Alopecia, Bleeding Lesions, Change in 

Hair, Change in Nails, Change in Pigmentation, Changing Lesions, Dry Skin, 

Erythema, Furuncle, Hirsutism, Lesions, New Lesions, Non-Healing Lesions, 

Photosensitivity, Pruritus, Rash, Skin Pain, Skin Ulcer, Sores, Striae, Swelling

, Unusual Bruising, Wounds, Jaundice, Other





- Neurological


Neurological: As Per HPI





- Psychiatric


Psychiatric: As Per HPI





- Endocrine


Endocrine: absent: As Per HPI, Change in Body Appearance, Change in Libido, 

Cold Intolorance, Deepening of Voice, Excessive Sweating, Fatigue, Flushing, 

Heat Intolorance, Increase in Ring/Shoe/Hat Size, Palpitations, Polydipsia, 

Polyphagia, Polyuria, Other





- Hematologic/Lymphatic


Hematologic: absent: As Per HPI, Easy Bleeding, Easy Bruising, Lymphadenopathy, 

Other





Past Patient History





- Past Medical History & Family History


Past Medical History?: Yes





- Past Social History


Smoking Status: Current Some Days Smoker





- CARDIAC


Hx Cardiac Disorders: Yes (HTN, high cholesterol)





- PULMONARY


Hx Respiratory Disorders: Yes (COPD)





- NEUROLOGICAL


Hx Neurological Disorder: No





- HEENT


Hx HEENT Problems: No





- RENAL


Hx Chronic Kidney Disease: No





- ENDOCRINE/METABOLIC


Hx Endocrine Disorders: No





- HEMATOLOGICAL/ONCOLOGICAL


Hx Blood Disorders: No





- INTEGUMENTARY


Hx Dermatological Problems: No





- MUSCULOSKELETAL/RHEUMATOLOGICAL


Hx Musculoskeletal Disorders: No


Hx Falls: No





- GENITOURINARY/GYNECOLOGICAL


Hx Genitourinary Disorders: No





- PSYCHIATRIC


Hx Psychophysiologic Disorder: Yes (dementia, mood disorder)





- SURGICAL HISTORY


Other/Comment: pt denies edwin surgeries





- ANESTHESIA


Hx Anesthesia: No





Meds


Allergies/Adverse Reactions: 


 Allergies











Allergy/AdvReac Type Severity Reaction Status Date / Time


 


cortisone Allergy  RASH Verified 01/31/18 22:43


 


mustard Allergy  RASH Uncoded 01/31/18 22:43














- Medications


Medications: 


 Current Medications





Acetaminophen (Tylenol 325mg Tab)  650 mg PO Q6H PRN


   PRN Reason: Temp >101


Acetaminophen (Tylenol 325mg Tab)  650 mg PO Q6H PRN


   PRN Reason: Pain, Mild (1-3)


Amlodipine Besylate (Norvasc)  10 mg PO DAILY [facility]


   Last Admin: 02/04/18 09:41 Dose:  10 mg


Cholecalciferol (Vitamin D)  2,000 intlu PO DAILY [facility]


   Last Admin: 02/04/18 09:42 Dose:  2,000 intlu


Cyproheptadine HCl (Periactin)  4 mg PO BID [facility]


   Last Admin: 02/04/18 09:42 Dose:  4 mg


Divalproex Sodium (Depakote Sprinkles)  125 mg PO DAILY [facility]


   Last Admin: 02/04/18 09:40 Dose:  125 mg


Docusate Sodium (Colace)  100 mg PO BID [facility]


   Last Admin: 02/04/18 09:39 Dose:  100 mg


Escitalopram Oxalate (Lexapro)  5 mg PO DAILY [facility]


   Last Admin: 02/04/18 09:40 Dose:  5 mg


Famotidine (Pepcid)  20 mg PO HS [facility]


   Last Admin: 02/03/18 21:05 Dose:  20 mg


Ferrous Sulfate (Feosol)  325 mg PO QPM [facility]


   Last Admin: 02/03/18 17:15 Dose:  325 mg


Folic Acid (Folic Acid)  1 mg PO DAILY [facility]


   Last Admin: 02/04/18 09:40 Dose:  1 mg


Heparin Sodium (Porcine) (Heparin)  5,000 units SC Q12 [facility]


   PRN Reason: Protocol


   Last Admin: 02/04/18 09:40 Dose:  5,000 units


Meropenem 500 mg/ Sodium (Chloride)  100 mls @ 100 mls/hr IVPB Q8@0500,1300,

2100 [facility]


   PRN Reason: Protocol


   Last Admin: 02/04/18 12:42 Dose:  100 mls/hr


Dextrose/Sodium Chloride (Dextrose 5%/0.45% Ns 1000 Ml)  1,000 mls @ 50 mls/hr 

IV .Q20H [facility]


   Stop: 02/07/18 07:30


   Last Admin: 02/04/18 09:47 Dose:  50 mls/hr


Magnesium Hydroxide (Milk Of Magnesia)  30 ml PO DAILY [facility]


   PRN Reason: Constipation


Pravastatin Sodium (Pravachol)  20 mg PO [facility]


   Last Admin: 02/03/18 21:05 Dose:  20 mg


Fluticasone/Salmeterol (Advair Diskus 250/50)  1 puff IH Q12H [facility]


   Last Admin: 02/04/18 05:11 Dose:  1 puff











Physical Exam





- Constitutional


Appears: Non-toxic, Confused, Chronically Ill





- Head Exam


Head Exam: ATRAUMATIC, NORMAL INSPECTION, NORMOCEPHALIC





- Eye Exam


Eye Exam: PERRL.  absent: Scleral icterus





- ENT Exam


ENT Exam: Mucous Membranes Dry, Normal External Ear Exam, Normal Oropharynx





- Neck Exam


Neck exam: Negative for: Lymphadenopathy, Thyromegaly





- Respiratory Exam


Respiratory Exam: Decreased Breath Sounds, Clear to Auscultation Bilateral, 

NORMAL BREATHING PATTERN





- Cardiovascular Exam


Cardiovascular Exam: REGULAR RHYTHM, +S1, +S2





- GI/Abdominal Exam


GI & Abdominal Exam: Diminished Bowel Sounds, Soft.  absent: Tenderness





- Rectal Exam


Rectal Exam: Deferred





-  Exam


 Exam: NORMAL INSPECTION





- Extremities Exam


Extremities exam: Positive for: pedal pulses present.  Negative for: calf 

tenderness, pedal edema, tenderness





- Back Exam


Back exam: absent: CVA tenderness (L), CVA tenderness (R), paraspinal tenderness





- Neurological Exam


Neurological exam: Alert, Altered, CN II-XII Intact, Oriented x3, Reflexes 

Normal





- Psychiatric Exam


Psychiatric exam: Depressed





- Skin


Skin Exam: Dry





Results





- Vital Signs


Recent Vital Signs: 


 Last Vital Signs











Temp  97.9 F   02/04/18 08:23


 


Pulse  81   02/04/18 09:41


 


Resp  20   02/04/18 08:23


 


BP  131/78   02/04/18 09:41


 


Pulse Ox  98   02/04/18 08:23














- Labs


Result Diagrams: 


 02/04/18 05:30





 02/04/18 05:30


Labs: 


 Laboratory Results - last 24 hr











  02/04/18 02/04/18





  05:30 05:30


 


WBC  5.9 


 


RBC  3.57 L 


 


Hgb  11.7 L 


 


Hct  36.0 


 


MCV  100.9 H 


 


MCH  32.8 H 


 


MCHC  32.5 L 


 


RDW  16.2 H 


 


Plt Count  192 


 


Sodium   143


 


Potassium   4.0


 


Chloride   106


 


Carbon Dioxide   29


 


Anion Gap   12


 


BUN   26 H


 


Creatinine   1.2


 


Est GFR ( Amer)   52


 


Est GFR (Non-Af Amer)   43


 


Random Glucose   100


 


Calcium   9.4


 


Total Bilirubin   0.4


 


AST   22


 


ALT   15


 


Alkaline Phosphatase   41


 


Total Protein   6.4


 


Albumin   3.2 L


 


Globulin   3.2


 


Albumin/Globulin Ratio   1.0














Assessment & Plan


(1) Adjustment disorder


Status: Acute   





(2) UTI (urinary tract infection)


Status: Acute   





(3) ESBL (extended spectrum beta-lactamase) producing bacteria infection


Status: Acute   





(4) Infection due to ESBL-producing Escherichia coli


Status: Acute   





(5) Anemia


Status: Acute   





(6) Dehydration


Status: Acute   





(7) BHUMI (acute kidney injury)


Status: Acute   





- Assessment and Plan (Free Text)


Assessment: 





cont iv merrem


consider imaging  and  eval

## 2018-02-05 LAB
ALBUMIN SERPL-MCNC: 3.7 G/DL (ref 3.5–5)
ALBUMIN/GLOB SERPL: 1.1 {RATIO} (ref 1–2.1)
ALT SERPL-CCNC: 20 U/L (ref 9–52)
AST SERPL-CCNC: 14 U/L (ref 14–36)
BILIRUB UR-MCNC: NEGATIVE MG/DL
BUN SERPL-MCNC: 21 MG/DL (ref 7–17)
CALCIUM SERPL-MCNC: 9.5 MG/DL (ref 8.4–10.2)
COLOR UR: (no result)
ERYTHROCYTE [DISTWIDTH] IN BLOOD BY AUTOMATED COUNT: 16.3 % (ref 11.5–14.5)
GFR NON-AFRICAN AMERICAN: 48
GLUCOSE UR STRIP-MCNC: (no result) MG/DL
HGB BLD-MCNC: 12.4 G/DL (ref 12–16)
LEUKOCYTE ESTERASE UR-ACNC: (no result) LEU/UL
MCH RBC QN AUTO: 32.8 PG (ref 27–31)
MCHC RBC AUTO-ENTMCNC: 32.6 G/DL (ref 33–37)
MCV RBC AUTO: 100.7 FL (ref 81–99)
PH UR STRIP: 7 [PH] (ref 5–8)
PLATELET # BLD: 204 K/UL (ref 130–400)
PROT UR STRIP-MCNC: NEGATIVE MG/DL
RBC # BLD AUTO: 3.77 MIL/UL (ref 3.8–5.2)
RBC # UR STRIP: NEGATIVE /UL
SP GR UR STRIP: 1.01 (ref 1–1.03)
SQUAMOUS EPITHIAL: < 1 /HPF (ref 0–5)
URINE CLARITY: CLEAR
URINE NITRATE: NEGATIVE
UROBILINOGEN UR-MCNC: (no result) MG/DL (ref 0.2–1)
WBC # BLD AUTO: 6.2 K/UL (ref 4.8–10.8)

## 2018-02-05 RX ADMIN — DIVALPROEX SODIUM SCH MG: 125 CAPSULE ORAL at 09:24

## 2018-02-05 RX ADMIN — FLUTICASONE PROPIONATE AND SALMETEROL SCH PUFF: 50; 250 POWDER RESPIRATORY (INHALATION) at 05:24

## 2018-02-05 RX ADMIN — FLUTICASONE PROPIONATE AND SALMETEROL SCH PUFF: 50; 250 POWDER RESPIRATORY (INHALATION) at 17:15

## 2018-02-05 RX ADMIN — VITAMIN D, TAB 1000IU (100/BT) SCH INTLU: 25 TAB at 09:24

## 2018-02-05 RX ADMIN — DEXTROSE AND SODIUM CHLORIDE SCH: 5; 450 INJECTION, SOLUTION INTRAVENOUS at 05:21

## 2018-02-05 NOTE — US
PROCEDURE:  Ultrasound of the Kidneys



HISTORY:

Pyelonephritis. 



COMPARISON:

None available.



TECHNIQUE:

Sonogram of the kidneys.



FINDINGS:



RIGHT KIDNEY:

Measures: 4.9 x 6.6 x 9.5 cm. 



Normal in size, contour and echogenicity 



No stone, solid mass lesion or hydronephrosis visualized. 



LEFT KIDNEY:

Measures: 5.3 x 5.5 x 10.1cm. 



Normal in size, contour and echogenicity. 



No stone, solid mass lesion or hydronephrosis visualized. 



Incidental finding(s): Simple cyst midpole region 



1.9 x 2 cm 



OTHER FINDINGS:

None.



IMPRESSION:

No significant or acute  findings to account for/ related to the 

clinical presentation.



Additional benign and/or incidental findings described above.

## 2018-02-05 NOTE — CP.PCM.PN
Subjective





- Date & Time of Evaluation


Date of Evaluation: 02/05/18


Time of Evaluation: 07:00





- Subjective


Subjective: 





renAL us ORDERED


CONT IV RX ESBL





Objective





- Vital Signs/Intake and Output


Vital Signs (last 24 hours): 


 











Temp Pulse Resp BP Pulse Ox


 


 98 F   61   20   145/80   96 


 


 02/05/18 08:35  02/05/18 09:23  02/05/18 08:35  02/05/18 09:23  02/05/18 08:35











- Medications


Medications: 


 Current Medications





Acetaminophen (Tylenol 325mg Tab)  650 mg PO Q6H PRN


   PRN Reason: Temp >101


Acetaminophen (Tylenol 325mg Tab)  650 mg PO Q6H PRN


   PRN Reason: Pain, Mild (1-3)


Amlodipine Besylate (Norvasc)  10 mg PO DAILY [facility]


   Last Admin: 02/05/18 09:23 Dose:  10 mg


Cholecalciferol (Vitamin D)  2,000 intlu PO DAILY [facility]


   Last Admin: 02/05/18 09:24 Dose:  2,000 intlu


Cyproheptadine HCl (Periactin)  4 mg PO BID [facility]


   Last Admin: 02/05/18 09:23 Dose:  4 mg


Divalproex Sodium (Depakote Sprinkles)  125 mg PO DAILY [facility]


   Last Admin: 02/05/18 09:24 Dose:  125 mg


Docusate Sodium (Colace)  100 mg PO BID [facility]


   Last Admin: 02/05/18 09:24 Dose:  100 mg


Escitalopram Oxalate (Lexapro)  5 mg PO DAILY [facility]


   Last Admin: 02/05/18 09:23 Dose:  5 mg


Famotidine (Pepcid)  20 mg PO HS [facility]


   Last Admin: 02/04/18 21:23 Dose:  20 mg


Ferrous Sulfate (Feosol)  325 mg PO QPM [facility]


   Last Admin: 02/04/18 17:00 Dose:  325 mg


Folic Acid (Folic Acid)  1 mg PO DAILY [facility]


   Last Admin: 02/05/18 09:24 Dose:  1 mg


Heparin Sodium (Porcine) (Heparin)  5,000 units SC Q12 [facility]


   PRN Reason: Protocol


   Last Admin: 02/05/18 09:23 Dose:  5,000 units


Meropenem 500 mg/ Sodium (Chloride)  100 mls @ 100 mls/hr IVPB Q8@0500,1300,

2100 [facility]


   PRN Reason: Protocol


   Last Admin: 02/05/18 05:23 Dose:  100 mls/hr


Dextrose/Sodium Chloride (Dextrose 5%/0.45% Ns 1000 Ml)  1,000 mls @ 50 mls/hr 

IV .Q20H [facility]


   Stop: 02/07/18 07:30


   Last Admin: 02/05/18 05:21 Dose:  Not Given


Magnesium Hydroxide (Milk Of Magnesia)  30 ml PO DAILY [facility]


   PRN Reason: Constipation


Pravastatin Sodium (Pravachol)  20 mg PO HS [facility]


   Last Admin: 02/04/18 21:23 Dose:  20 mg


Fluticasone/Salmeterol (Advair Diskus 250/50)  1 puff IH Q12H [facility]


   Last Admin: 02/05/18 05:24 Dose:  1 puff











- Labs


Labs: 


 





 02/05/18 06:15 





 02/05/18 06:15 











Assessment and Plan


(1) Adjustment disorder


Status: Acute   





(2) UTI (urinary tract infection)


Status: Acute   





(3) ESBL (extended spectrum beta-lactamase) producing bacteria infection


Status: Acute   





(4) Infection due to ESBL-producing Escherichia coli


Status: Acute   





(5) Anemia


Status: Acute   





(6) Dehydration


Status: Acute   





(7) BHUMI (acute kidney injury)


Status: Acute

## 2018-02-05 NOTE — PN
DATE:  02/05/2018



SUBJECTIVE:  The patient is seen and examined.  Interim events noted. 

Consults noted and appreciated.  The patient remains in regular medical

floor and on one-to-one observation.  The patient feels okay.  Denies

having specific complaints of chest pain or shortness of breath.  No

urinary symptoms.



PHYSICAL EXAMINATION:

GENERAL:  The patient is in no acute distress.

VITAL SIGNS:  Stable.

HEART:  S1 and S2, normal and regular.

LUNGS:  Good bilateral air exchange.

ABDOMEN:  Soft and nontender.

EXTREMITIES:  No edema.  No calf swelling.  No tenderness.  No acute

ischemia.

CENTRAL NERVOUS SYSTEM:  Exam is essentially unchanged.



DIAGNOSTIC DATA:  Available diagnostic data reviewed.  Urine culture shows

ESBL positive.



ASSESSMENT AND PLAN:  _____ consult noted and appreciated.  Plan as

ordered.





__________________________________________

Nasir Boone MD





DD:  02/05/2018 10:19:46

DT:  02/05/2018 10:37:55

Job # [id number]

## 2018-02-06 RX ADMIN — DIVALPROEX SODIUM SCH MG: 125 CAPSULE ORAL at 09:32

## 2018-02-06 RX ADMIN — FLUTICASONE PROPIONATE AND SALMETEROL SCH PUFF: 50; 250 POWDER RESPIRATORY (INHALATION) at 17:16

## 2018-02-06 RX ADMIN — DEXTROSE AND SODIUM CHLORIDE SCH MLS/HR: 5; 450 INJECTION, SOLUTION INTRAVENOUS at 03:21

## 2018-02-06 RX ADMIN — VITAMIN D, TAB 1000IU (100/BT) SCH INTLU: 25 TAB at 09:31

## 2018-02-06 RX ADMIN — DEXTROSE AND SODIUM CHLORIDE SCH: 5; 450 INJECTION, SOLUTION INTRAVENOUS at 03:18

## 2018-02-06 RX ADMIN — FLUTICASONE PROPIONATE AND SALMETEROL SCH PUFF: 50; 250 POWDER RESPIRATORY (INHALATION) at 07:02

## 2018-02-06 NOTE — CP.PCM.PN
Subjective





- Date & Time of Evaluation


Date of Evaluation: 02/06/18


Time of Evaluation: 08:10





- Subjective


Subjective: 





Pt seen resting comfortably at bedside. No events reported overnight. Patient 

is currently receiving IV antibiotics for ESBL. Denies any chest pain, N/V/F/C





Objective





- Vital Signs/Intake and Output


Vital Signs (last 24 hours): 


 











Temp Pulse Resp BP Pulse Ox


 


 97.9 F   63   18   146/78   93 L


 


 02/05/18 23:58  02/05/18 23:58  02/05/18 23:58  02/05/18 23:58  02/05/18 23:58











- Medications


Medications: 


 Current Medications





Acetaminophen (Tylenol 325mg Tab)  650 mg PO Q6H PRN


   PRN Reason: Temp >101


Acetaminophen (Tylenol 325mg Tab)  650 mg PO Q6H PRN


   PRN Reason: Pain, Mild (1-3)


Amlodipine Besylate (Norvasc)  10 mg PO DAILY [facility]


   Last Admin: 02/05/18 09:23 Dose:  10 mg


Cholecalciferol (Vitamin D)  2,000 intlu PO DAILY [facility]


   Last Admin: 02/05/18 09:24 Dose:  2,000 intlu


Cyproheptadine HCl (Periactin)  4 mg PO BID [facility]


   Last Admin: 02/05/18 17:16 Dose:  4 mg


Divalproex Sodium (Depakote Sprinkles)  125 mg PO DAILY [facility]


   Last Admin: 02/05/18 09:24 Dose:  125 mg


Docusate Sodium (Colace)  100 mg PO BID [facility]


   Last Admin: 02/05/18 17:15 Dose:  100 mg


Escitalopram Oxalate (Lexapro)  5 mg PO DAILY [facility]


   Last Admin: 02/05/18 09:23 Dose:  5 mg


Famotidine (Pepcid)  20 mg PO HS [facility]


   Last Admin: 02/05/18 22:01 Dose:  20 mg


Ferrous Sulfate (Feosol)  325 mg PO QPM [facility]


   Last Admin: 02/05/18 17:15 Dose:  325 mg


Folic Acid (Folic Acid)  1 mg PO DAILY [facility]


   Last Admin: 02/05/18 09:24 Dose:  1 mg


Heparin Sodium (Porcine) (Heparin)  5,000 units SC Q12 [facility]


   PRN Reason: Protocol


   Last Admin: 02/05/18 21:58 Dose:  5,000 units


Meropenem 500 mg/ Sodium (Chloride)  100 mls @ 100 mls/hr IVPB Q8@0500,1300,

2100 [facility]


   PRN Reason: Protocol


   Last Admin: 02/06/18 04:56 Dose:  100 mls/hr


Dextrose/Sodium Chloride (Dextrose 5%/0.45% Ns 1000 Ml)  1,000 mls @ 50 mls/hr 

IV .Q20H [facility]


   Stop: 02/07/18 07:30


   Last Admin: 02/06/18 03:21 Dose:  50 mls/hr


Magnesium Hydroxide (Milk Of Magnesia)  30 ml PO DAILY [facility]


   PRN Reason: Constipation


Pravastatin Sodium (Pravachol)  20 mg PO HS [facility]


   Last Admin: 02/05/18 22:01 Dose:  20 mg


Fluticasone/Salmeterol (Advair Diskus 250/50)  1 puff IH Q12H [facility]


   Last Admin: 02/06/18 07:02 Dose:  1 puff











- Labs


Labs: 


 





 02/05/18 06:15 





 02/05/18 06:15 











- Constitutional


Appears: Non-toxic, No Acute Distress





- Head Exam


Head Exam: ATRAUMATIC, NORMAL INSPECTION





- Eye Exam


Pupil Exam: PERRL





- Respiratory Exam


Respiratory Exam: Clear to Ausculation Bilateral, NORMAL BREATHING PATTERN





- Cardiovascular Exam


Cardiovascular Exam: REGULAR RHYTHM, +S1, +S2





- GI/Abdominal Exam


GI & Abdominal Exam: Soft.  absent: Tenderness





- Neurological Exam


Neurological Exam: Alert, Awake, CN II-XII Intact, Oriented x3





Assessment and Plan





- Assessment and Plan (Free Text)


Assessment: 





82 TO F w/ h/o unknown type of dementia had behavioral disturbances most likely 

secondary to the UTI





1) Delerium secondary to the UTI (improving)


- UTI (ESBL) :  ID on board. 


- C/W meropenum


- Renal U/S no acute findings noted


- Psych consult appreciated


- Urology consulted


- C/W 1: 1





2) DVT prophylaxis


- Heparin





PT/OT

## 2018-02-07 RX ADMIN — DEXTROSE AND SODIUM CHLORIDE SCH: 5; 450 INJECTION, SOLUTION INTRAVENOUS at 04:52

## 2018-02-07 RX ADMIN — DIVALPROEX SODIUM SCH MG: 125 CAPSULE ORAL at 08:45

## 2018-02-07 RX ADMIN — VITAMIN D, TAB 1000IU (100/BT) SCH INTLU: 25 TAB at 08:45

## 2018-02-07 RX ADMIN — FLUTICASONE PROPIONATE AND SALMETEROL SCH PUFF: 50; 250 POWDER RESPIRATORY (INHALATION) at 05:54

## 2018-02-07 RX ADMIN — FLUTICASONE PROPIONATE AND SALMETEROL SCH PUFF: 50; 250 POWDER RESPIRATORY (INHALATION) at 17:02

## 2018-02-07 RX ADMIN — DEXTROSE AND SODIUM CHLORIDE SCH MLS/HR: 5; 450 INJECTION, SOLUTION INTRAVENOUS at 05:51

## 2018-02-07 NOTE — CP.PCM.PN
Subjective





- Date & Time of Evaluation


Date of Evaluation: 02/07/18


Time of Evaluation: 07:00





- Subjective


Subjective: 





improving


afebrile


alert





Objective





- Vital Signs/Intake and Output


Vital Signs (last 24 hours): 


 











Temp Pulse Resp BP Pulse Ox


 


 98.3 F   61   20   158/69 H  93 L


 


 02/07/18 08:07  02/07/18 08:45  02/07/18 08:07  02/07/18 08:45  02/07/18 08:07











- Medications


Medications: 


 Current Medications





Acetaminophen (Tylenol 325mg Tab)  650 mg PO Q6H PRN


   PRN Reason: Temp >101


Acetaminophen (Tylenol 325mg Tab)  650 mg PO Q6H PRN


   PRN Reason: Pain, Mild (1-3)


Amlodipine Besylate (Norvasc)  10 mg PO DAILY [facility]


   Last Admin: 02/07/18 08:45 Dose:  10 mg


Cholecalciferol (Vitamin D)  2,000 intlu PO DAILY [facility]


   Last Admin: 02/07/18 08:45 Dose:  2,000 intlu


Cyproheptadine HCl (Periactin)  4 mg PO BID [facility]


   Last Admin: 02/07/18 08:44 Dose:  4 mg


Divalproex Sodium (Depakote Sprinkles)  125 mg PO DAILY [facility]


   Last Admin: 02/07/18 08:45 Dose:  125 mg


Docusate Sodium (Colace)  100 mg PO BID [facility]


   Last Admin: 02/07/18 08:45 Dose:  100 mg


Escitalopram Oxalate (Lexapro)  5 mg PO DAILY [facility]


   Last Admin: 02/07/18 08:45 Dose:  5 mg


Famotidine (Pepcid)  20 mg PO HS [facility]


   Last Admin: 02/06/18 21:16 Dose:  20 mg


Ferrous Sulfate (Feosol)  325 mg PO QPM [facility]


   Last Admin: 02/06/18 17:16 Dose:  325 mg


Folic Acid (Folic Acid)  1 mg PO DAILY [facility]


   Last Admin: 02/07/18 08:45 Dose:  1 mg


Heparin Sodium (Porcine) (Heparin)  5,000 units SC Q12 [facility]


   PRN Reason: Protocol


   Last Admin: 02/07/18 08:46 Dose:  5,000 units


Meropenem 500 mg/ Sodium (Chloride)  100 mls @ 100 mls/hr IVPB Q8@0500,1300,

2100 [facility]


   PRN Reason: Protocol


   Last Admin: 02/07/18 12:04 Dose:  100 mls/hr


Magnesium Hydroxide (Milk Of Magnesia)  30 ml PO DAILY [facility]


   PRN Reason: Constipation


Pravastatin Sodium (Pravachol)  20 mg PO HS [facility]


   Last Admin: 02/06/18 21:16 Dose:  20 mg


Fluticasone/Salmeterol (Advair Diskus 250/50)  1 puff IH Q12H [facility]


   Last Admin: 02/07/18 05:54 Dose:  1 puff











- Labs


Labs: 


 





 02/05/18 06:15 





 02/05/18 06:15 











- Constitutional


Appears: Well





- Head Exam


Head Exam: ATRAUMATIC, NORMAL INSPECTION, NORMOCEPHALIC





- Eye Exam


Eye Exam: EOMI, Normal appearance, PERRL


Pupil Exam: NORMAL ACCOMODATION, PERRL





- ENT Exam


ENT Exam: Mucous Membranes Moist, Normal Exam





- Neck Exam


Neck Exam: Full ROM, Normal Inspection.  absent: Lymphadenopathy





- Respiratory Exam


Respiratory Exam: Clear to Ausculation Bilateral, NORMAL BREATHING PATTERN





- Cardiovascular Exam


Cardiovascular Exam: REGULAR RHYTHM, +S1, +S2.  absent: Murmur





- GI/Abdominal Exam


GI & Abdominal Exam: Soft, Normal Bowel Sounds.  absent: Tenderness





- Rectal Exam


Rectal Exam: NORMAL INSPECTION





-  Exam


 Exam: Circumcision, NORMAL INSPECTION


External exam: NORMAL EXTERNAL EXAM


Speculum exam: NORMAL SPECULUM EXAM


Bimanual exam: NORMAL BIMANUAL EXAM





- Extremities Exam


Extremities Exam: Full ROM, Normal Capillary Refill, Normal Inspection.  absent

: Joint Swelling, Pedal Edema





- Back Exam


Back Exam: NORMAL INSPECTION





- Neurological Exam


Neurological Exam: Alert, Awake, CN II-XII Intact, Normal Gait, Oriented x3





- Psychiatric Exam


Psychiatric exam: Normal Affect, Normal Mood





- Skin


Skin Exam: Dry, Intact, Normal Color, Warm





Assessment and Plan


(1) Adjustment disorder


Status: Acute   





(2) UTI (urinary tract infection)


Status: Acute   





(3) ESBL (extended spectrum beta-lactamase) producing bacteria infection


Status: Acute   





(4) Infection due to ESBL-producing Escherichia coli


Status: Acute   





(5) Anemia


Status: Acute   





(6) Dehydration


Status: Acute   





(7) BHUMI (acute kidney injury)


Status: Acute

## 2018-02-07 NOTE — CP.PCM.PN
Subjective





- Date & Time of Evaluation


Date of Evaluation: 02/07/18


Time of Evaluation: 07:52





- Subjective


Subjective: 





81 YO  F w/ ESBL UTI is seen at bedside doing well resting comfortably.  States 

she slept well last night, and appetite is good. Does not have any complaints 

at this time. 


- Denies chest pain, SOB, N/V/D





Objective





- Vital Signs/Intake and Output


Vital Signs (last 24 hours): 


 











Temp Pulse Resp BP Pulse Ox


 


 98.2 F   56 L  18   112/68   94 L


 


 02/07/18 01:18  02/07/18 01:18  02/07/18 01:18  02/07/18 01:18  02/07/18 01:18











- Medications


Medications: 


 Current Medications





Acetaminophen (Tylenol 325mg Tab)  650 mg PO Q6H PRN


   PRN Reason: Temp >101


Acetaminophen (Tylenol 325mg Tab)  650 mg PO Q6H PRN


   PRN Reason: Pain, Mild (1-3)


Amlodipine Besylate (Norvasc)  10 mg PO DAILY [facility]


   Last Admin: 02/06/18 09:31 Dose:  10 mg


Cholecalciferol (Vitamin D)  2,000 intlu PO DAILY [facility]


   Last Admin: 02/06/18 09:31 Dose:  2,000 intlu


Cyproheptadine HCl (Periactin)  4 mg PO BID [facility]


   Last Admin: 02/06/18 17:16 Dose:  4 mg


Divalproex Sodium (Depakote Sprinkles)  125 mg PO DAILY [facility]


   Last Admin: 02/06/18 09:32 Dose:  125 mg


Docusate Sodium (Colace)  100 mg PO BID [facility]


   Last Admin: 02/06/18 17:16 Dose:  100 mg


Escitalopram Oxalate (Lexapro)  5 mg PO DAILY [facility]


   Last Admin: 02/06/18 09:31 Dose:  5 mg


Famotidine (Pepcid)  20 mg PO HS [facility]


   Last Admin: 02/06/18 21:16 Dose:  20 mg


Ferrous Sulfate (Feosol)  325 mg PO QPM [facility]


   Last Admin: 02/06/18 17:16 Dose:  325 mg


Folic Acid (Folic Acid)  1 mg PO DAILY [facility]


   Last Admin: 02/06/18 09:32 Dose:  1 mg


Heparin Sodium (Porcine) (Heparin)  5,000 units SC Q12 [facility]


   PRN Reason: Protocol


   Last Admin: 02/06/18 21:11 Dose:  5,000 units


Meropenem 500 mg/ Sodium (Chloride)  100 mls @ 100 mls/hr IVPB Q8@0500,1300,

2100 [facility]


   PRN Reason: Protocol


   Last Admin: 02/07/18 05:52 Dose:  100 mls/hr


Magnesium Hydroxide (Milk Of Magnesia)  30 ml PO DAILY [facility]


   PRN Reason: Constipation


Pravastatin Sodium (Pravachol)  20 mg PO HS [facility]


   Last Admin: 02/06/18 21:16 Dose:  20 mg


Fluticasone/Salmeterol (Advair Diskus 250/50)  1 puff IH Q12H [facility]


   Last Admin: 02/07/18 05:54 Dose:  1 puff











- Labs


Labs: 


 





 02/05/18 06:15 





 02/05/18 06:15 











- Constitutional


Appears: No Acute Distress





- Head Exam


Head Exam: NORMAL INSPECTION





- Eye Exam


Eye Exam: Normal appearance





- Neck Exam


Neck Exam: Normal Inspection





- Respiratory Exam


Respiratory Exam: Clear to Ausculation Bilateral, NORMAL BREATHING PATTERN.  

absent: Rhonchi, Wheezes





- Cardiovascular Exam


Cardiovascular Exam: REGULAR RHYTHM, +S1, +S2





- GI/Abdominal Exam


GI & Abdominal Exam: Soft





- Extremities Exam


Extremities Exam: absent: Calf Tenderness





- Neurological Exam


Neurological Exam: Alert, Awake, CN II-XII Intact, Oriented x3





- Skin


Skin Exam: Normal Color, Warm





Assessment and Plan





- Assessment and Plan (Free Text)


Assessment: 





82 TO F w/ h/o unknown type of dementia had behavioral disturbances most likely 

secondary to the UTI





1) ESBL UTI (improving)


- UTI (ESBL) :  ID on board. 


- C/W meropenum


- Renal U/S no acute findings noted


- Urology consulted





2) DVT prophylaxis


- Heparin





PT/OT

## 2018-02-08 LAB
ALBUMIN SERPL-MCNC: 3.8 G/DL (ref 3.5–5)
ALBUMIN/GLOB SERPL: 1.1 {RATIO} (ref 1–2.1)
ALT SERPL-CCNC: 17 U/L (ref 9–52)
AST SERPL-CCNC: 15 U/L (ref 14–36)
BUN SERPL-MCNC: 28 MG/DL (ref 7–17)
BUN SERPL-MCNC: 28 MG/DL (ref 7–17)
CALCIUM SERPL-MCNC: 9.8 MG/DL (ref 8.4–10.2)
CALCIUM SERPL-MCNC: 9.9 MG/DL (ref 8.4–10.2)
ERYTHROCYTE [DISTWIDTH] IN BLOOD BY AUTOMATED COUNT: 16.1 % (ref 11.5–14.5)
FOLATE SERPL-MCNC: > 20 NG/ML
GFR NON-AFRICAN AMERICAN: 43
GFR NON-AFRICAN AMERICAN: 48
HGB BLD-MCNC: 12.8 G/DL (ref 12–16)
MCH RBC QN AUTO: 33.1 PG (ref 27–31)
MCHC RBC AUTO-ENTMCNC: 32.9 G/DL (ref 33–37)
MCV RBC AUTO: 100.6 FL (ref 81–99)
PLATELET # BLD: 213 K/UL (ref 130–400)
RBC # BLD AUTO: 3.86 MIL/UL (ref 3.8–5.2)
VIT B12 SERPL-MCNC: 385 PG/ML (ref 239–931)
WBC # BLD AUTO: 6.7 K/UL (ref 4.8–10.8)

## 2018-02-08 RX ADMIN — FLUTICASONE PROPIONATE AND SALMETEROL SCH PUFF: 50; 250 POWDER RESPIRATORY (INHALATION) at 05:07

## 2018-02-08 RX ADMIN — FLUTICASONE PROPIONATE AND SALMETEROL SCH PUFF: 50; 250 POWDER RESPIRATORY (INHALATION) at 17:23

## 2018-02-08 RX ADMIN — DIVALPROEX SODIUM SCH MG: 125 CAPSULE ORAL at 08:56

## 2018-02-08 RX ADMIN — VITAMIN D, TAB 1000IU (100/BT) SCH INTLU: 25 TAB at 09:00

## 2018-02-08 NOTE — CON
This is an 82-year-old lady admitted _____ with evidence of sepsis.  The

patient was treated with antibiotics given meropenem, organism was E. coli.

Renal ultrasound revealed no evidence of pathology.  The patient is being

treated as of 02/05/2018.  After antibiotic therapy, the patient is calm

with no evidence of any confusion and is doing well with medication as

done.  If there is any further problems with recurrent infection, please

notify me.  We will follow as necessary.





__________________________________________

Cedric Vaughan MD



DD:  02/07/2018 15:07:40

DT:  02/07/2018 18:57:00

Job # [id number]

## 2018-02-08 NOTE — PQF GENQUE
Dr. Boone,



3 queries as follows:

1. In agreement with the diagnosis of Sepsis? if yes: 

2.   POA? 

3. Etiology? 



OR: Other explanation of clinical finding

OR: Disagree

OR: Unable to determine 



WBC:8.8->6.4->6.1->5.9->6.2  left shift 

Pulse:117->78->96->83->100->98->72



BUN:40->21

Creatinine:1.9-.1.1

Est GFR ( Amer/Non-Af Amer): 31/25->58.48



urine culture: ec- dangelo





ID consult: ID: sent from her NH w/ acute agitation in the context of a UTI. 
Patient is currently calm with writer, denies depression/anxiety /AH/VH/SI/HI. 
she c/o back pain and was found to have ESBL + urine c/s 

(1) Adjustment disorder Status: Acute (2) UTI (urinary tract infection) Status: 
Acute 

(3) ESBL (extended spectrum beta-lactamase) producing bacteria infection Status
: Acute 

(4) Infection due to ESBL-producing Escherichia coli Status: Acute 

(5) Anemia Status: Acute 

(6) Dehydration Status: Acute 

(7) BHUMI (acute kidney injury) Status: Acute 



2/7: Draft consult  Dr. Vaughan: 82-year-old lady admitted _____ with evidence 
of sepsis. The  patient was treated with antibiotics given meropenem, organism 
was E. coli. Renal ultrasound revealed no evidence of pathology. The patient 
isb eing  treated as of 02/05/2018. After antibiotic therapy, the patient is 
calm with no evidence of any confusion and is doing well with medication as  
done.







***** This form is a permanent part of the medical record*****





          

Clarification of your documentation is requested to better reflect the severity 
of illness and intensity of treatment of your patient.  



Indicators present      



[]  Specify: []

         



[]  Specify: []         

 



[]  Specify: []         





[]  Specify: []         





Location in the medical record that reflects the above clinical findings:  []

 



Treatment Provided:  []

  

PHYSICIAN'S RESPONSE





Based on your medical judgment of the clinical indicators outlined above please 
clarify the following:



[]  Practitioner response 



[]  If unable to determine, please check the box, sign and date.  





Present On Admission (POA) Indicator:





[] Present at the time of admission 



[] Not present at the time of admission



[] Clinically Undetermined









In responding to this query, please exercise your independent professional 
judgment.  The fact that a question is asked does not imply that any particular 
answer is desired or expected.  Thank you for your clarification on this 
documentation.



If you have any questions please call.

* Thank you,

             Sherri Ballard RN 

   ext. #[phone] 
MTDD

## 2018-02-08 NOTE — PQF GENQUE
Dr. Boone,



2 queries : 

1. In agreement with the diagnosis of Acute Kidney Injury?

2. Cause if known



OR: Disagree

OR:  Clinically unable to determine 

OR: Unknown



BUN:40->21

Creatinine:1.9-.1.1

Est GFR ( Amer/Non-Af Amer): 31/25->58.48



WBC:8.8->6.4->6.1->5.9->6.2  left shift 

Pulse:117->78->96->83->100->98->72







ER:  Clinical Imp.: Dehydration, UTI (urinary tract infection), Altered mental 
status, BHUMI (acute kidney injury) 



Attending: Non-compliant with meds in the NH, aggressive behavior-urine shows 
gm neg rods-Admitting Imp.: AMS, Dehydration, UTI, Elevated Cholesterol, 
Dementia 



ID consult note and follow up progress notes: diagnoses:  7. BHUMI (acute kidney 
injury 







2/7: Draft consult  Dr. Vaughan: 82-year-old lady admitted _____ with evidence 
of sepsis. The  patient was treated with antibiotics given meropenem, organism 
was E. coli. Renal ultrasound revealed no evidence of pathology. The patient is 
being  treated as of 02/05/2018. After antibiotic therapy, the patient is calm 
with no evidence of any confusion and is doing well with medication as  done.



IVF 1000ccs hr. x 500 ccs -> 50 ccs. hr. and IVF discontinued on  2/4/17 









***** This form is a permanent part of the medical record*****





          

Clarification of your documentation is requested to better reflect the severity 
of illness and intensity of treatment of your patient.  



Indicators present      



[]  Specify: []

         



[]  Specify: []         

 



[]  Specify: []         





[]  Specify: []         





Location in the medical record that reflects the above clinical findings:  []

 



Treatment Provided:  []

  

PHYSICIAN'S RESPONSE





Based on your medical judgment of the clinical indicators outlined above please 
clarify the following:



[]  Practitioner response 



[]  If unable to determine, please check the box, sign and date.  





Present On Admission (POA) Indicator:





[] Present at the time of admission 



[] Not present at the time of admission



[] Clinically Undetermined









In responding to this query, please exercise your independent professional 
judgment.  The fact that a question is asked does not imply that any particular 
answer is desired or expected.  Thank you for your clarification on this 
documentation.



If you have any questions please call.

* Thank you,

   Sherri Ballard RN

   ext. #[phone] 
MTDD

## 2018-02-08 NOTE — CP.PCM.PN
Subjective





- Date & Time of Evaluation


Date of Evaluation: 02/08/18


Time of Evaluation: 08:30





- Subjective


Subjective: 





83 YO F seen resting comfortably in bed. Pt is currently receiving IV 

antibiotics for ESBL in contact isolation.  No overnight events reported. 


Denies chest pain, SOB, N/V/D. 





Objective





- Vital Signs/Intake and Output


Vital Signs (last 24 hours): 


 











Temp Pulse Resp BP Pulse Ox


 


 98.0 F   62   18   116/68   93 L


 


 02/08/18 07:59  02/08/18 07:59  02/08/18 07:59  02/08/18 07:59  02/08/18 07:59











- Medications


Medications: 


 Current Medications





Acetaminophen (Tylenol 325mg Tab)  650 mg PO Q6H PRN


   PRN Reason: Temp >101


Acetaminophen (Tylenol 325mg Tab)  650 mg PO Q6H PRN


   PRN Reason: Pain, Mild (1-3)


Amlodipine Besylate (Norvasc)  10 mg PO DAILY [facility]


   Last Admin: 02/07/18 08:45 Dose:  10 mg


Cholecalciferol (Vitamin D)  2,000 intlu PO DAILY [facility]


   Last Admin: 02/07/18 08:45 Dose:  2,000 intlu


Cyproheptadine HCl (Periactin)  4 mg PO BID [facility]


   Last Admin: 02/07/18 17:03 Dose:  4 mg


Divalproex Sodium (Depakote Sprinkles)  125 mg PO DAILY [facility]


   Last Admin: 02/07/18 08:45 Dose:  125 mg


Docusate Sodium (Colace)  100 mg PO BID [facility]


   Last Admin: 02/07/18 17:02 Dose:  100 mg


Escitalopram Oxalate (Lexapro)  5 mg PO DAILY [facility]


   Last Admin: 02/07/18 08:45 Dose:  5 mg


Famotidine (Pepcid)  20 mg PO HS [facility]


   Last Admin: 02/07/18 22:19 Dose:  20 mg


Ferrous Sulfate (Feosol)  325 mg PO QPM [facility]


   Last Admin: 02/07/18 17:02 Dose:  325 mg


Folic Acid (Folic Acid)  1 mg PO DAILY [facility]


   Last Admin: 02/07/18 08:45 Dose:  1 mg


Heparin Sodium (Porcine) (Heparin)  5,000 units SC Q12 [facility]


   PRN Reason: Protocol


   Last Admin: 02/07/18 20:48 Dose:  5,000 units


Meropenem 500 mg/ Sodium (Chloride)  100 mls @ 100 mls/hr IVPB Q8@0500,1300,

2100 [facility]


   PRN Reason: Protocol


   Last Admin: 02/08/18 05:08 Dose:  100 mls/hr


Magnesium Hydroxide (Milk Of Magnesia)  30 ml PO DAILY [facility]


   PRN Reason: Constipation


Pravastatin Sodium (Pravachol)  20 mg PO HS [facility]


   Last Admin: 02/07/18 22:19 Dose:  20 mg


Fluticasone/Salmeterol (Advair Diskus 250/50)  1 puff IH Q12H [facility]


   Last Admin: 02/08/18 05:07 Dose:  1 puff











- Labs


Labs: 


 





 02/08/18 05:50 





 02/08/18 05:50 











- Constitutional


Appears: No Acute Distress





- Head Exam


Head Exam: NORMAL INSPECTION





- Eye Exam


Eye Exam: Normal appearance





- ENT Exam


ENT Exam: Mucous Membranes Moist





- Respiratory Exam


Respiratory Exam: Clear to Ausculation Bilateral, NORMAL BREATHING PATTERN.  

absent: Rhonchi, Wheezes





- Cardiovascular Exam


Cardiovascular Exam: REGULAR RHYTHM, +S1, +S2





- GI/Abdominal Exam


GI & Abdominal Exam: Soft.  absent: Tenderness





- Extremities Exam


Extremities Exam: absent: Calf Tenderness





- Neurological Exam


Neurological Exam: Alert, Awake, CN II-XII Intact





- Skin


Skin Exam: Normal Color, Warm





Assessment and Plan





- Assessment and Plan (Free Text)


Assessment: 





82 TO F w/ h/o unknown type of dementia had behavioral disturbances most likely 

secondary to the UTI





1) Sepsis secondary to UTI (improving)


- UTI (ESBL) :  ID on board. 


- C/W meropenum


- Renal U/S no acute findings noted


- Urology consulted: Awaiting urology input





2) BHUMI secondary to dehydration (resolving)





2) DVT prophylaxis


- Heparin





PT/OT

## 2018-02-09 VITALS — HEART RATE: 65 BPM | OXYGEN SATURATION: 94 %

## 2018-02-09 VITALS — TEMPERATURE: 97.7 F | SYSTOLIC BLOOD PRESSURE: 139 MMHG | DIASTOLIC BLOOD PRESSURE: 76 MMHG | RESPIRATION RATE: 18 BRPM

## 2018-02-09 RX ADMIN — VITAMIN D, TAB 1000IU (100/BT) SCH INTLU: 25 TAB at 09:34

## 2018-02-09 RX ADMIN — DIVALPROEX SODIUM SCH MG: 125 CAPSULE ORAL at 09:35

## 2018-02-09 RX ADMIN — FLUTICASONE PROPIONATE AND SALMETEROL SCH PUFF: 50; 250 POWDER RESPIRATORY (INHALATION) at 05:06

## 2018-02-09 NOTE — CP.PCM.PN
Subjective





- Date & Time of Evaluation


Date of Evaluation: 02/09/18


Time of Evaluation: 08:50





- Subjective


Subjective: 





Patient seen resting comfortably in bed. States she slept well overnight. Has a 

good appetite. Denies any chest pain, SOB, N/V/D. Patient denies any burning 

during urination.








Objective





- Vital Signs/Intake and Output


Vital Signs (last 24 hours): 


 











Temp Pulse Resp BP Pulse Ox


 


 97.7 F   65   18   139/76   94 L


 


 02/09/18 07:57  02/09/18 07:57  02/09/18 07:57  02/09/18 07:57  02/09/18 07:57











- Medications


Medications: 


 Current Medications





Acetaminophen (Tylenol 325mg Tab)  650 mg PO Q6H [facility]


   PRN Reason: Temp >101


Acetaminophen (Tylenol 325mg Tab)  650 mg PO Q6H [facility]


   PRN Reason: Pain, Mild (1-3)


Amlodipine Besylate (Norvasc)  10 mg PO DAILY [facility]


   Last Admin: 02/08/18 08:59 Dose:  10 mg


Cholecalciferol (Vitamin D)  2,000 intlu PO DAILY [facility]


   Last Admin: 02/08/18 09:00 Dose:  2,000 intlu


Cyproheptadine HCl (Periactin)  4 mg PO BID [facility]


   Last Admin: 02/08/18 17:24 Dose:  4 mg


Divalproex Sodium (Depakote Sprinkles)  125 mg PO DAILY [facility]


   Last Admin: 02/08/18 08:56 Dose:  125 mg


Docusate Sodium (Colace)  100 mg PO BID [facility]


   Last Admin: 02/08/18 17:24 Dose:  100 mg


Escitalopram Oxalate (Lexapro)  5 mg PO DAILY [facility]


   Last Admin: 02/08/18 08:58 Dose:  5 mg


Famotidine (Pepcid)  20 mg PO HS [facility]


   Last Admin: 02/08/18 22:16 Dose:  20 mg


Ferrous Sulfate (Feosol)  325 mg PO QPM [facility]


   Last Admin: 02/08/18 17:24 Dose:  325 mg


Folic Acid (Folic Acid)  1 mg PO DAILY [facility]


   Last Admin: 02/08/18 08:57 Dose:  1 mg


Heparin Sodium (Porcine) (Heparin)  5,000 units SC Q12 [facility]


   PRN Reason: Protocol


   Last Admin: 02/08/18 22:16 Dose:  5,000 units


Meropenem 500 mg/ Sodium (Chloride)  100 mls @ 100 mls/hr IVPB Q8@0500,1300,

2100 [facility]


   PRN Reason: Protocol


   Last Admin: 02/09/18 04:52 Dose:  100 mls/hr


Magnesium Hydroxide (Milk Of Magnesia)  30 ml PO DAILY [facility]


   PRN Reason: Constipation


Nicotine (Nicoderm Cq)  1 patch TD DAILY [facility]


   Last Admin: 02/08/18 15:42 Dose:  1 patch


Pravastatin Sodium (Pravachol)  20 mg PO HS [facility]


   Last Admin: 02/08/18 22:16 Dose:  20 mg


Fluticasone/Salmeterol (Advair Diskus 250/50)  1 puff IH Q12H [facility]


   Last Admin: 02/09/18 05:06 Dose:  1 puff











- Labs


Labs: 


 





 02/08/18 05:50 





 02/08/18 09:00 











- Constitutional


Appears: No Acute Distress





- Head Exam


Head Exam: NORMAL INSPECTION





- Eye Exam


Eye Exam: Normal appearance





- Respiratory Exam


Respiratory Exam: Clear to Ausculation Bilateral, NORMAL BREATHING PATTERN.  

absent: Rhonchi, Wheezes





- Cardiovascular Exam


Cardiovascular Exam: REGULAR RHYTHM, +S1, +S2





- GI/Abdominal Exam


GI & Abdominal Exam: Soft, Normal Bowel Sounds.  absent: Tenderness





- Extremities Exam


Extremities Exam: Normal Inspection





- Neurological Exam


Neurological Exam: Alert, Awake, CN II-XII Intact, Oriented x3





Assessment and Plan





- Assessment and Plan (Free Text)


Assessment: 








82 TO F w/ h/o unknown type of dementia had behavioral disturbances most likely 

secondary to the UTI





1) Sepsis secondary to UTI on admission (improving)


- UTI (ESBL) :  ID on board. 


- C/W meropenum


- Renal U/S no acute findings noted


- Urology consulted: Recommends continuing with IV antibiotics





2) BHUMI secondary to dehydration on admission (resolving)





2) DVT prophylaxis


- Heparin





PT/OT

## 2018-02-09 NOTE — CP.PCM.PN
Subjective





- Date & Time of Evaluation


Date of Evaluation: 02/09/18


Time of Evaluation: 09:00





- Subjective


Subjective: 





afeb


awake alert NAD





Objective





- Vital Signs/Intake and Output


Vital Signs (last 24 hours): 


 











Temp Pulse Resp BP Pulse Ox


 


 97.7 F   65   18   139/76   94 L


 


 02/09/18 07:57  02/09/18 09:33  02/09/18 07:57  02/09/18 09:33  02/09/18 07:57











- Medications


Medications: 


 Current Medications





Acetaminophen (Tylenol 325mg Tab)  650 mg PO Q6H PRN


   PRN Reason: Temp >101


Acetaminophen (Tylenol 325mg Tab)  650 mg PO Q6H PRN


   PRN Reason: Pain, Mild (1-3)


Amlodipine Besylate (Norvasc)  10 mg PO DAILY [facility]


   Last Admin: 02/09/18 09:33 Dose:  10 mg


Cholecalciferol (Vitamin D)  2,000 intlu PO DAILY [facility]


   Last Admin: 02/09/18 09:34 Dose:  2,000 intlu


Cyproheptadine HCl (Periactin)  4 mg PO BID [facility]


   Last Admin: 02/09/18 09:33 Dose:  4 mg


Divalproex Sodium (Depakote Sprinkles)  125 mg PO DAILY [facility]


   Last Admin: 02/09/18 09:35 Dose:  125 mg


Docusate Sodium (Colace)  100 mg PO BID [facility]


   Last Admin: 02/09/18 09:34 Dose:  100 mg


Escitalopram Oxalate (Lexapro)  5 mg PO DAILY [facility]


   Last Admin: 02/09/18 09:34 Dose:  5 mg


Famotidine (Pepcid)  20 mg PO HS [facility]


   Last Admin: 02/08/18 22:16 Dose:  20 mg


Ferrous Sulfate (Feosol)  325 mg PO QPM [facility]


   Last Admin: 02/08/18 17:24 Dose:  325 mg


Folic Acid (Folic Acid)  1 mg PO DAILY [facility]


   Last Admin: 02/09/18 09:35 Dose:  1 mg


Heparin Sodium (Porcine) (Heparin)  5,000 units SC Q12 [facility]


   PRN Reason: Protocol


   Last Admin: 02/09/18 09:35 Dose:  5,000 units


Meropenem 500 mg/ Sodium (Chloride)  100 mls @ 100 mls/hr IVPB Q8@0500,1300,

2100 [facility]


   PRN Reason: Protocol


   Last Admin: 02/09/18 12:07 Dose:  100 mls/hr


Magnesium Hydroxide (Milk Of Magnesia)  30 ml PO DAILY [facility]


   PRN Reason: Constipation


Nicotine (Nicoderm Cq)  1 patch TD DAILY [facility]


   Last Admin: 02/09/18 09:34 Dose:  1 patch


Pravastatin Sodium (Pravachol)  20 mg PO HS [facility]


   Last Admin: 02/08/18 22:16 Dose:  20 mg


Fluticasone/Salmeterol (Advair Diskus 250/50)  1 puff IH Q12H [facility]


   Last Admin: 02/09/18 05:06 Dose:  1 puff











- Labs


Labs: 


 





 02/08/18 05:50 





 02/08/18 09:00 











- Constitutional


Appears: Well





- Head Exam


Head Exam: ATRAUMATIC, NORMAL INSPECTION, NORMOCEPHALIC





- Eye Exam


Eye Exam: EOMI, Normal appearance, PERRL


Pupil Exam: NORMAL ACCOMODATION, PERRL





- ENT Exam


ENT Exam: Mucous Membranes Moist, Normal Exam





- Neck Exam


Neck Exam: Full ROM, Normal Inspection.  absent: Lymphadenopathy





- Respiratory Exam


Respiratory Exam: Clear to Ausculation Bilateral, NORMAL BREATHING PATTERN





- Cardiovascular Exam


Cardiovascular Exam: REGULAR RHYTHM, +S1, +S2.  absent: Murmur





- GI/Abdominal Exam


GI & Abdominal Exam: Soft, Normal Bowel Sounds.  absent: Tenderness





- Rectal Exam


Rectal Exam: Deferred





- Extremities Exam


Extremities Exam: Full ROM, Normal Capillary Refill, Normal Inspection.  absent

: Joint Swelling, Pedal Edema





- Back Exam


Back Exam: NORMAL INSPECTION





- Neurological Exam


Neurological Exam: Alert, Awake, CN II-XII Intact, Normal Gait, Oriented x3





- Psychiatric Exam


Psychiatric exam: Normal Affect, Normal Mood





- Skin


Skin Exam: Dry, Intact, Normal Color, Warm





Assessment and Plan


(1) Adjustment disorder


Status: Acute   





(2) UTI (urinary tract infection)


Status: Acute   





(3) ESBL (extended spectrum beta-lactamase) producing bacteria infection


Status: Acute   





(4) Infection due to ESBL-producing Escherichia coli


Assessment & Plan: 


cont iv then po rx  macrodantin


Status: Acute   





(5) Anemia


Status: Acute   





(6) Dehydration


Status: Acute   





(7) BHUMI (acute kidney injury)


Status: Acute